# Patient Record
Sex: FEMALE | Race: WHITE | Employment: OTHER | URBAN - METROPOLITAN AREA
[De-identification: names, ages, dates, MRNs, and addresses within clinical notes are randomized per-mention and may not be internally consistent; named-entity substitution may affect disease eponyms.]

---

## 2017-03-03 ENCOUNTER — OFFICE VISIT (OUTPATIENT)
Dept: CARDIOLOGY CLINIC | Age: 72
End: 2017-03-03

## 2017-03-03 VITALS
HEART RATE: 76 BPM | HEIGHT: 64 IN | SYSTOLIC BLOOD PRESSURE: 130 MMHG | WEIGHT: 173 LBS | DIASTOLIC BLOOD PRESSURE: 80 MMHG | BODY MASS INDEX: 29.53 KG/M2

## 2017-03-03 DIAGNOSIS — I48.91 ATRIAL FIBRILLATION, UNSPECIFIED TYPE (HCC): Primary | ICD-10-CM

## 2017-03-03 DIAGNOSIS — I10 ESSENTIAL HYPERTENSION: ICD-10-CM

## 2017-03-03 DIAGNOSIS — E03.9 HYPOTHYROIDISM, UNSPECIFIED TYPE: ICD-10-CM

## 2017-03-03 PROCEDURE — 99214 OFFICE O/P EST MOD 30 MIN: CPT | Performed by: INTERNAL MEDICINE

## 2017-09-22 ENCOUNTER — OFFICE VISIT (OUTPATIENT)
Dept: CARDIOLOGY CLINIC | Age: 72
End: 2017-09-22

## 2017-09-22 VITALS
DIASTOLIC BLOOD PRESSURE: 76 MMHG | HEIGHT: 64 IN | BODY MASS INDEX: 29.02 KG/M2 | HEART RATE: 54 BPM | SYSTOLIC BLOOD PRESSURE: 156 MMHG | WEIGHT: 170 LBS

## 2017-09-22 DIAGNOSIS — I48.91 ATRIAL FIBRILLATION, UNSPECIFIED TYPE (HCC): Primary | ICD-10-CM

## 2017-09-22 DIAGNOSIS — E78.5 HYPERLIPIDEMIA, UNSPECIFIED HYPERLIPIDEMIA TYPE: ICD-10-CM

## 2017-09-22 DIAGNOSIS — I10 ESSENTIAL HYPERTENSION: ICD-10-CM

## 2017-09-22 PROCEDURE — 93000 ELECTROCARDIOGRAM COMPLETE: CPT | Performed by: INTERNAL MEDICINE

## 2017-09-22 PROCEDURE — 99214 OFFICE O/P EST MOD 30 MIN: CPT | Performed by: INTERNAL MEDICINE

## 2017-09-22 RX ORDER — RANITIDINE 150 MG/1
150 TABLET ORAL 2 TIMES DAILY
COMMUNITY
End: 2018-11-29

## 2017-10-16 ENCOUNTER — TELEPHONE (OUTPATIENT)
Dept: CARDIOLOGY CLINIC | Age: 72
End: 2017-10-16

## 2017-10-16 NOTE — TELEPHONE ENCOUNTER
Received a call from pt describing dizziness and head buzzing, can't stand, room is not spinning. Does have multiple sclerosis and uses walker. Lisinopril was dc'd due to anaphylaxis, ICU admission and clonidine was dc'd at the same time. Takes carvedilol 12.5 mg twice daily and has not yet taken her med this am. /98. Has been keeping track of BP and has been 135-174/. Also will be calling pcp to discuss. Please address as [facility] is out.

## 2018-04-16 ENCOUNTER — OFFICE VISIT (OUTPATIENT)
Dept: CARDIOLOGY CLINIC | Age: 73
End: 2018-04-16

## 2018-04-16 VITALS
BODY MASS INDEX: 31.41 KG/M2 | HEART RATE: 62 BPM | WEIGHT: 184 LBS | HEIGHT: 64 IN | DIASTOLIC BLOOD PRESSURE: 82 MMHG | SYSTOLIC BLOOD PRESSURE: 138 MMHG

## 2018-04-16 DIAGNOSIS — I10 ESSENTIAL HYPERTENSION: Primary | ICD-10-CM

## 2018-04-16 DIAGNOSIS — I48.91 ATRIAL FIBRILLATION, UNSPECIFIED TYPE (HCC): ICD-10-CM

## 2018-04-16 PROCEDURE — 99214 OFFICE O/P EST MOD 30 MIN: CPT | Performed by: INTERNAL MEDICINE

## 2018-11-29 ENCOUNTER — OFFICE VISIT (OUTPATIENT)
Dept: CARDIOLOGY CLINIC | Age: 73
End: 2018-11-29
Payer: MEDICARE

## 2018-11-29 VITALS
HEART RATE: 56 BPM | WEIGHT: 181 LBS | DIASTOLIC BLOOD PRESSURE: 78 MMHG | HEIGHT: 64 IN | BODY MASS INDEX: 30.9 KG/M2 | SYSTOLIC BLOOD PRESSURE: 134 MMHG

## 2018-11-29 DIAGNOSIS — I48.91 ATRIAL FIBRILLATION, UNSPECIFIED TYPE (HCC): ICD-10-CM

## 2018-11-29 DIAGNOSIS — I10 ESSENTIAL HYPERTENSION: Primary | ICD-10-CM

## 2018-11-29 PROCEDURE — 99214 OFFICE O/P EST MOD 30 MIN: CPT | Performed by: INTERNAL MEDICINE

## 2018-11-29 NOTE — PROGRESS NOTES
[facility]   Cardiac Evaluation      Patient: Ciro Weinberg  YOB: 1945  Date: 8/10/15       Chief Complaint   Patient presents with    Atrial Fibrillation    Hypertension        Referring provider: Sanaz lAcaraz MD    History of Present Illness:   Ms Marleen Marr is seen today for follow up. She is treated for hypertension and paroxysmal atrial fibrillation. She continues to struggle with multiple sclerosis. Lauren Corona states in August, 2011 she had abdominal surgery with Dr Marykay Moritz for a paraesophageal hernia with Nissen fundoplication and a G tube. March, 2014 Lauren Corona developed tongue swelling when she woke in the morning. She called her sister and was taken to [facility]. She states she woke up 5 days later in the ICU. She states she had an allergic reaction to Lisinopril. Clonidine was also discontinued. She was transferred to [facility] where she went through rehab. Today, Ms Marleen Marr states she has been having trouble with MS because of the fluctuating weather. She denies any chest pain, CASTANEDA, palpitations, dizziness, or edema. Lauren Corona has noticed her heart racing at times; states they occur at night and last long enough for her to notice. She admits to doing things she should not be doing like vacuuming and cleaning up after her new puppy. She continues to walk with a walker. Lauren Corona has developed heartburn that occurs after eating. She is taking Pepcid for this. Past Medical History:   has a past medical history of Hyperlipidemia; Hypertension; Hypothyroidism; MS (multiple sclerosis) ([address] and Pituitary tumor. Surgical History:  Abdominal surgery, ankle surgery    Social History:  History     Social History    Marital Status:       Spouse Name: N/A     Number of Children: N/A    Years of Education: N/A     Occupational History    Western artist     Social History Main Topics    Smoking status: Never Smoker     Smokeless tobacco: Not on file   Dez Alcohol Use: No    Drug Use: No    Sexually Active:       . 2 children who are alive and well. Other Topics Concern    Not on file     Social History Narrative    No narrative on file       Family History:  family history includes Heart Disease in her mother. Allergies:  Levothyroxine sodium; Lisinopril; Lyrica [pregabalin]; and Neurontin [gabapentin]     Review of Systems:   · Constitutional: there has been no unanticipated weight loss. No change in energy or activity level   · Eyes: No visual changes   · ENT: No Headaches, hearing loss or vertigo. No mouth sores or sore throat. · Cardiovascular: Reviewed in HPI  · Respiratory: No cough or wheezing, no sputum production. No hematemesis. · Gastrointestinal: No abdominal pain, appetite loss, blood in stools. No change in bowel or bladder habits. · Genitourinary: No nocturia, dysuria, trouble voiding  · Musculoskeletal:  No gait disturbance, weakness or joint complaints. · Integumentary: No rash or pruritis. · Neurological: No headache, change in muscle strength, numbness or tingling. No change in gait, balance, coordination, mood, affect, memory, mentation, behavior. · Psychiatric: No anxiety or depression  · Endocrine: No malaise or fever  · Hematologic/Lymphatic: No abnormal bruising or bleeding, blood clots or swollen lymph nodes. · Allergic/Immunologic: No nasal congestion or hives. Physical Examination:    Vitals:    11/29/18 1221   BP: 134/78   Site: Left Upper Arm   Position: Sitting   Cuff Size: Medium Adult   Pulse: 56   Weight: 181 lb (82.1 kg)   Height: 5' 4\" (1.626 m)     Body mass index is 31.07 kg/m².      Wt Readings from Last 3 Encounters:   11/29/18 181 lb (82.1 kg)   04/16/18 184 lb (83.5 kg)   09/22/17 170 lb (77.1 kg)     BP Readings from Last 3 Encounters:   11/29/18 134/78   04/16/18 138/82   09/22/17 (!) 156/76        Constitutional and General Appearance:  appears stated age  Respiratory:  · Normal excursion and expansion without use of accessory muscles  · Resp Auscultation: Normal breath sounds without dullness  Cardiovascular:  · The apical impulses not displaced  · Heart is regular rate and rhythm with normal S1, S2  · The carotid upstroke is normal, bruit noted   · JVP is not elevated  · Peripheral pulses are symmetrical  · There is no clubbing, cyanosis of the extremities  · No edema  · Femoral Arteries: 2+ and equal  · Pedal Pulses: 2+ and equal   Abdomen:  · No masses or tenderness  · Normal bowel sounds  Neurological/Psychiatric:  · Alert and oriented x3  · Moves all extremities well  · Exhibits normal gait balance and coordination      Assessment/Plan  1. Essential hypertension -stable   2. Atrial fibrillation -paroxysmal, regular rhythm today  Echo 4/24/14: [facility]) EF 55-60%, mild cLVH,  Moderate AR, mild AS, mildly dilated ascending aorta  Echo 1/9/04: normal left ventricular wall motion and contractility, may be diastolic dysfunction of left ventricle, no significant valvular abnormalities seen. Left atrial size normal   3. DVT -resolved, no longer on Coumadin  4. Carotid stenosis -stable  Doppler 1/53: 3-97% LICA, 18-65% KATIUSKA  5. Hypothyroidism -10/16: TSH 9.66, allergy to Synthroid now on [place] Thyroid. Previously referred to Dr Jennifer Reardon    PLAN:Continue same meds. Scribe's attestation: This note was scribed in the presence of Dr Hadley Menendez MD by Destinee Lan, MERRY. The scribe's documentation has been prepared under my direction and personally reviewed by me in its entirety. I confirm that the note above accurately reflects all work, treatment, procedures, and medical decision making performed by me. Thank you for allowing to me to participate in the care of LIEN Szymanski.

## 2018-12-04 NOTE — COMMUNICATION BODY
[address]   Cardiac Evaluation      Patient: Chris Orozco  YOB: 1945  Date: 8/10/15       Chief Complaint   Patient presents with    Atrial Fibrillation    Hypertension        Referring provider: Mati Michel MD    History of Present Illness:   Ms Blanca Roman is seen today for follow up. She is treated for hypertension and paroxysmal atrial fibrillation. She continues to struggle with multiple sclerosis. Gregory Packer states in August, 2011 she had abdominal surgery with Dr Moraima Rees for a paraesophageal hernia with Nissen fundoplication and a G tube. March, 2014 Gregory Packer developed tongue swelling when she woke in the morning. She called her sister and was taken to [facility]. She states she woke up 5 days later in the ICU. She states she had an allergic reaction to Lisinopril. Clonidine was also discontinued. She was transferred to [facility] where she went through rehab. Today, Ms Blanca Roman states she has been having trouble with MS because of the fluctuating weather. She denies any chest pain, CASTANEDA, palpitations, dizziness, or edema. Gregory Packer has noticed her heart racing at times; states they occur at night and last long enough for her to notice. She admits to doing things she should not be doing like vacuuming and cleaning up after her new puppy. She continues to walk with a walker. Gregory Packer has developed heartburn that occurs after eating. She is taking Pepcid for this. Past Medical History:   has a past medical history of Hyperlipidemia; Hypertension; Hypothyroidism; MS (multiple sclerosis) ([address] and Pituitary tumor. Surgical History:  Abdominal surgery, ankle surgery    Social History:  History     Social History    Marital Status:       Spouse Name: N/A     Number of Children: N/A    Years of Education: N/A     Occupational History    Western artist     Social History Main Topics    Smoking status: Never Smoker     Smokeless tobacco: Not on file   [facility]

## 2019-08-29 ENCOUNTER — OFFICE VISIT (OUTPATIENT)
Dept: CARDIOLOGY CLINIC | Age: 74
End: 2019-08-29
Payer: MEDICARE

## 2019-08-29 VITALS
WEIGHT: 173 LBS | BODY MASS INDEX: 29.53 KG/M2 | SYSTOLIC BLOOD PRESSURE: 140 MMHG | DIASTOLIC BLOOD PRESSURE: 80 MMHG | HEART RATE: 56 BPM | HEIGHT: 64 IN

## 2019-08-29 DIAGNOSIS — I48.91 ATRIAL FIBRILLATION, UNSPECIFIED TYPE (HCC): Primary | ICD-10-CM

## 2019-08-29 DIAGNOSIS — E78.5 HYPERLIPIDEMIA, UNSPECIFIED HYPERLIPIDEMIA TYPE: ICD-10-CM

## 2019-08-29 DIAGNOSIS — I10 ESSENTIAL HYPERTENSION: ICD-10-CM

## 2019-08-29 PROCEDURE — 99214 OFFICE O/P EST MOD 30 MIN: CPT | Performed by: INTERNAL MEDICINE

## 2019-08-29 PROCEDURE — 93000 ELECTROCARDIOGRAM COMPLETE: CPT | Performed by: INTERNAL MEDICINE

## 2019-08-29 RX ORDER — CARVEDILOL 6.25 MG/1
6.25 TABLET ORAL 2 TIMES DAILY WITH MEALS
Qty: 60 TABLET | Refills: 6
Start: 2019-08-29 | End: 2020-02-25

## 2019-11-21 ENCOUNTER — OFFICE VISIT (OUTPATIENT)
Dept: CARDIOLOGY CLINIC | Age: 74
End: 2019-11-21
Payer: MEDICARE

## 2019-11-21 VITALS
HEIGHT: 64 IN | SYSTOLIC BLOOD PRESSURE: 158 MMHG | BODY MASS INDEX: 29.37 KG/M2 | HEART RATE: 60 BPM | WEIGHT: 172 LBS | DIASTOLIC BLOOD PRESSURE: 80 MMHG

## 2019-11-21 DIAGNOSIS — I10 ESSENTIAL HYPERTENSION: ICD-10-CM

## 2019-11-21 DIAGNOSIS — I48.91 ATRIAL FIBRILLATION, UNSPECIFIED TYPE (HCC): Primary | ICD-10-CM

## 2019-11-21 PROCEDURE — 99214 OFFICE O/P EST MOD 30 MIN: CPT | Performed by: INTERNAL MEDICINE

## 2019-11-21 PROCEDURE — 93000 ELECTROCARDIOGRAM COMPLETE: CPT | Performed by: INTERNAL MEDICINE

## 2020-02-25 ENCOUNTER — TELEPHONE (OUTPATIENT)
Dept: CARDIOLOGY CLINIC | Age: 75
End: 2020-02-25

## 2020-02-25 RX ORDER — CARVEDILOL 25 MG/1
TABLET ORAL
Qty: 180 TABLET | Refills: 3 | Status: SHIPPED | OUTPATIENT
Start: 2020-02-25 | End: 2020-02-25

## 2020-02-25 RX ORDER — CARVEDILOL 25 MG/1
12.5 TABLET ORAL 2 TIMES DAILY
Qty: 180 TABLET | Refills: 3
Start: 2020-02-25 | End: 2020-07-20

## 2020-05-28 ENCOUNTER — OFFICE VISIT (OUTPATIENT)
Dept: CARDIOLOGY CLINIC | Age: 75
End: 2020-05-28
Payer: MEDICARE

## 2020-05-28 VITALS
SYSTOLIC BLOOD PRESSURE: 144 MMHG | TEMPERATURE: 98.4 F | HEART RATE: 50 BPM | WEIGHT: 172 LBS | BODY MASS INDEX: 29.37 KG/M2 | DIASTOLIC BLOOD PRESSURE: 70 MMHG | HEIGHT: 64 IN | OXYGEN SATURATION: 94 %

## 2020-05-28 PROCEDURE — 99214 OFFICE O/P EST MOD 30 MIN: CPT | Performed by: INTERNAL MEDICINE

## 2020-05-28 NOTE — LETTER
[address] Cardiology [facility]  [address] [address]  Phone: [phone]  Fax: [phone]    Vivien Pat MD        May 29, 2020     [facility] [address] [address]    Patient: Ishan Payne  MR Number: [id number]  YOB: 1945  Date of Visit: 5/28/2020    Dear Dr. Gurpreet Weathers:    [employer]   Cardiac Evaluation      Patient: Ishan Payne  YOB: 1945  Date: 8/10/15       Chief Complaint   Patient presents with    Atrial Fibrillation    Hypertension        Referring provider: Gurpreet Weathers MD    History of Present Illness:   Ms Mindy Clark is seen today for follow up. She is treated for hypertension and paroxysmal atrial fibrillation. She continues to struggle with multiple sclerosis. Marino Wesbtrook states in August, 2011 she had abdominal surgery with Dr Maddie Child for a paraesophageal hernia with Nissen fundoplication and a G tube. March, 2014 she had angioedema from lisinopril. Today, Ms Mindy Clark is here with her sister. She has been monitoring her blood pressure. Most of her readings are high. Marino Westbrook denies any chest pain, palpitations, dizziness. Her feet and legs are edematous. She has shortness of breath that comes and goes. She has a new motorized wheelchair. Past Medical History:   has a past medical history of Hyperlipidemia, Hypertension, Hypothyroidism, MS (multiple sclerosis) ([address] and Pituitary tumor. Surgical History:  Abdominal surgery, ankle surgery    Social History:  History     Social History    Marital Status:       Spouse Name: N/A     Number of Children: N/A    Years of Education: N/A     Occupational History    Western artist     Social History Main Topics    Smoking status: Never Smoker     Smokeless tobacco: Not on file    Alcohol Use: No    Drug Use: No    Sexually Active:

## 2020-05-29 RX ORDER — HYDROCHLOROTHIAZIDE 12.5 MG/1
12.5 TABLET ORAL DAILY
Qty: 30 TABLET | Refills: 6 | Status: SHIPPED | OUTPATIENT
Start: 2020-05-29 | End: 2020-08-26 | Stop reason: CLARIF

## 2020-06-19 ENCOUNTER — HOSPITAL ENCOUNTER (OUTPATIENT)
Dept: CARDIOLOGY | Age: 75
Discharge: HOME | End: 2020-06-19
Payer: MEDICARE

## 2020-06-19 LAB
LEFT VENTRICULAR EJECTION FRACTION MODE: NORMAL
LV EF: 55 %
LV EF: 55 %
LVEF MODALITY: NORMAL

## 2020-06-19 PROCEDURE — 93306 TTE W/DOPPLER COMPLETE: CPT

## 2020-07-17 ENCOUNTER — TELEPHONE (OUTPATIENT)
Dept: CARDIOLOGY CLINIC | Age: 75
End: 2020-07-17

## 2020-07-17 NOTE — TELEPHONE ENCOUNTER
Pt states her BP has been running high all day and just now it is 157/106 pulse 97. Pt is fatigued, no energy and a heaviness when she breaths in. Pt states she has a lot of Stress right now.  Please call to advise

## 2020-07-17 NOTE — TELEPHONE ENCOUNTER
Pt has had some family arguments, bills and frig problems and woke up this am with hypertension and tachycardia. Her heart rate is usually slow and I had just decreased her coreg to 12.5 mg bid. Her heart rate is now 72 and her BP is a bit better. Imp: ?recurrent AF. She is no longer on AC. Plan; increase coreg to 25 bid. Start asa daily. Come to the office on Monday for EKG and to decide if she needs AC.

## 2020-07-20 ENCOUNTER — NURSE ONLY (OUTPATIENT)
Dept: CARDIOLOGY CLINIC | Age: 75
End: 2020-07-20
Payer: MEDICARE

## 2020-07-20 VITALS — DIASTOLIC BLOOD PRESSURE: 90 MMHG | SYSTOLIC BLOOD PRESSURE: 152 MMHG | HEART RATE: 126 BPM

## 2020-07-20 PROCEDURE — 93000 ELECTROCARDIOGRAM COMPLETE: CPT | Performed by: INTERNAL MEDICINE

## 2020-07-20 RX ORDER — CARVEDILOL 25 MG/1
25 TABLET ORAL 2 TIMES DAILY
Qty: 180 TABLET | Refills: 3
Start: 2020-07-20 | End: 2021-05-04

## 2020-07-20 NOTE — LETTER
[facility] Cardiology [facility] [address]  Phone: [phone]  Fax: [phone]    Bear Napier MD        July 23, 2020     Jorge Gutierrez, [facility] [address] [address]    Patient: Meghan Talamantes  MR Number: [id number]  YOB: 1945  Date of Visit: 7/20/2020    Dear Dr. Jorge Gutierrez:    [address]   Cardiac Evaluation      Patient: Meghan Talamantes  YOB: 1945  Date: 8/10/15       Chief Complaint   Patient presents with    Atrial Fibrillation    Hypertension        Referring provider: Jorge Gutierrez MD    History of Present Illness:   Ms Romayne Cork is seen today for symptoms. She is treated for hypertension and paroxysmal atrial fibrillation. She continues to struggle with multiple sclerosis. Perry Lew states in August, 2011 she had abdominal surgery with Dr Tia Mckeon for a paraesophageal hernia with Nissen fundoplication and a G tube. March, 2014 she had angioedema from lisinopril. Perry Lew called the office 7/17/20 stating she was feeling bad. Her blood pressure and HR's had been running high. I spoke w/ her over the phone and increased Coreg and advised her to come in the office today for EKG and follow up. Perry Lew states she has been under stress with family and bills. She feels her heart racing, but states it has slowed down some. She denies any chest pain, dizziness, or edema. Perry Lew has been fatigued. Past Medical History:   has a past medical history of Hyperlipidemia, Hypertension, Hypothyroidism, MS (multiple sclerosis) ([address] and Pituitary tumor. Surgical History:  Abdominal surgery, ankle surgery    Social History:  History     Social History    Marital Status:       Spouse Name: N/A     Number of Children: N/A    Years of Education: N/A     Occupational History    Western artist     Social History Main Topics    Smoking status: Never Smoker     Smokeless tobacco: Not on file · The apical impulses not displaced  · Heart is regular rate and rhythm with normal S1, S2  · The carotid upstroke is normal, bruit noted   · JVP is not elevated  · Peripheral pulses are symmetrical  · There is no clubbing, cyanosis of the extremities  · No edema  · Femoral Arteries: 2+ and equal  · Pedal Pulses: 2+ and equal   Abdomen:  · No masses or tenderness  · Normal bowel sounds  Neurological/Psychiatric:  · Alert and oriented x3  · Moves all extremities well  · Exhibits normal gait balance and coordination      Assessment/Plan  1. Essential hypertension -readings from home are mostly high  Echo 6/19/20: EF 55%. No regional wall motion abnormalities are seen. Diastolic filling parameters suggest grade I diastolic dysfunction. E/e\"=14.7. Mitral annular calcification is present. Mild mitral regurgitation. Aortic valve appears sclerotic but opens adequately. Moderate aortic regurgitation. Unable to estimate pulmonary artery pressure secondary to incomplete/absentTR jet envelope. 2. Atrial fibrillation -EKG>atrial fibrillation/flutter, HR 126bpm  EKG 11/20/19>sinus rhythm, first degree AV block, PAC's, HR 61bpm  EKG 8/29/19>sinus rhythm, first degree AV block, HR 56bpm -Coreg decreased   Echo 4/24/14: [facility]) EF 55-60%, mild cLVH,  Moderate AR, mild AS, mildly dilated ascending aorta  Echo 1/9/04: normal left ventricular wall motion and contractility, may be diastolic dysfunction of left ventricle, no significant valvular abnormalities seen. Left atrial size normal   3. DVT -resolved, no longer on Coumadin  4. Carotid stenosis -stable  Doppler 4/46: 0-71% LICA, 89-41% KATIUSKA  5. Hypothyroidism -10/16: TSH 9.66, allergy to Synthroid now on [place] Thyroid. Previously referred to Dr Luz Rosario    PLAN: Increase Coreg to 25mg BID, start Eliquis 5mg BID. Continue to monitor b/p and HR. Check TSH, free T4, CMP, and lipids. Scribe's attestation:  This note was scribed in the presence of Dr Mary Keene

## 2020-07-20 NOTE — PROGRESS NOTES
[gabapentin]     Review of Systems:   · Constitutional: there has been no unanticipated weight loss. No change in energy or activity level   · Eyes: No visual changes   · ENT: No Headaches, hearing loss or vertigo. No mouth sores or sore throat. · Cardiovascular: Reviewed in HPI  · Respiratory: No cough or wheezing, no sputum production. No hematemesis. · Gastrointestinal: No abdominal pain, appetite loss, blood in stools. No change in bowel or bladder habits. · Genitourinary: No nocturia, dysuria, trouble voiding  · Musculoskeletal:  No gait disturbance, weakness or joint complaints. · Integumentary: No rash or pruritis. · Neurological: No headache, change in muscle strength, numbness or tingling. MS is worse this year  · Psychiatric: No anxiety or depression  · Endocrine: No malaise or fever  · Hematologic/Lymphatic: No abnormal bruising or bleeding, blood clots or swollen lymph nodes. · Allergic/Immunologic: No nasal congestion or hives. Physical Examination:    Vitals:    07/20/20 1612   BP: (!) 152/90   Site: Right Upper Arm   Pulse: 126     There is no height or weight on file to calculate BMI.      Wt Readings from Last 3 Encounters:   05/28/20 172 lb (78 kg)   11/21/19 172 lb (78 kg)   08/29/19 173 lb (78.5 kg)     BP Readings from Last 3 Encounters:   07/20/20 (!) 152/90   05/28/20 (!) 144/70   11/21/19 (!) 158/80        Constitutional and General Appearance:  appears stated age  Respiratory:  · Normal excursion and expansion without use of accessory muscles  · Resp Auscultation: Normal breath sounds without dullness  Cardiovascular:  · The apical impulses not displaced  · Heart is regular rate and rhythm with normal S1, S2  · The carotid upstroke is normal, bruit noted   · JVP is not elevated  · Peripheral pulses are symmetrical  · There is no clubbing, cyanosis of the extremities  · No edema  · Femoral Arteries: 2+ and equal  · Pedal Pulses: 2+ and equal   Abdomen:  · No masses or me to participate in the care of LIEN Szymanski.

## 2020-07-23 LAB
ALBUMIN SERPL-MCNC: 3.3 G/DL (ref 3.5–5)
ALP BLD-CCNC: 110 IU/L (ref 35–104)
ALT SERPL-CCNC: 15 IU/L (ref 10–35)
ANION GAP SERPL CALCULATED.3IONS-SCNC: 12 MMOL/L (ref 6–18)
AST SERPL-CCNC: 16 IU/L (ref 10–35)
BILIRUB SERPL-MCNC: 0.3 MG/DL (ref 0–1)
BUN BLDV-MCNC: 20 MG/DL (ref 8–26)
CALCIUM SERPL-MCNC: 9.4 MG/DL (ref 8.8–10.1)
CHLORIDE BLD-SCNC: 99 MEQ/L (ref 101–111)
CHOLESTEROL, TOTAL: 175 MG/DL
CO2: 28 MMOL/L (ref 22–29)
CREAT SERPL-MCNC: 0.96 MG/DL (ref 0.44–1.03)
GFR AFRICAN AMERICAN: 64 ML/MIN/1.73 M2
GFR NON-AFRICAN AMERICAN: 56 ML/MIN/1.73 M2
GLUCOSE BLD-MCNC: 111 MG/DL (ref 70–99)
HDLC SERPL-MCNC: 57 MG/DL
LDL CHOLESTEROL CALCULATED: 96 MG/DL
NONHDLC SERPL-MCNC: 118 MG/DL
POTASSIUM SERPL-SCNC: 3.7 MEQ/L (ref 3.6–5.1)
SODIUM BLD-SCNC: 139 MEQ/L (ref 135–145)
T4 FREE: 6.97 NG/DL (ref 0.93–1.7)
TOTAL PROTEIN: 7.5 G/DL (ref 6.6–8.7)
TRIGL SERPL-MCNC: 110 MG/DL
TSH ULTRASENSITIVE: 0.17 MIU/L (ref 0.27–4.2)

## 2020-07-29 ENCOUNTER — OFFICE VISIT (OUTPATIENT)
Dept: CARDIOLOGY CLINIC | Age: 75
End: 2020-07-29
Payer: MEDICARE

## 2020-07-29 VITALS
HEART RATE: 75 BPM | HEIGHT: 64 IN | BODY MASS INDEX: 29.37 KG/M2 | DIASTOLIC BLOOD PRESSURE: 90 MMHG | WEIGHT: 172 LBS | SYSTOLIC BLOOD PRESSURE: 156 MMHG

## 2020-07-29 PROBLEM — E03.9 HYPOTHYROIDISM: Status: ACTIVE | Noted: 2020-07-29

## 2020-07-29 PROBLEM — I65.21 STENOSIS OF RIGHT CAROTID ARTERY: Status: ACTIVE | Noted: 2020-07-29

## 2020-07-29 PROCEDURE — 99214 OFFICE O/P EST MOD 30 MIN: CPT | Performed by: NURSE PRACTITIONER

## 2020-07-29 NOTE — PROGRESS NOTES
· Constitutional: there has been no unanticipated weight loss. No change in energy or activity level   · Eyes: No visual changes   · ENT: No Headaches, hearing loss or vertigo. No mouth sores or sore throat. · Cardiovascular: Reviewed in HPI  · Respiratory: No cough or wheezing, no sputum production. No hematemesis. · Gastrointestinal: No abdominal pain, appetite loss, blood in stools. No change in bowel or bladder habits. · Genitourinary: No nocturia, dysuria, trouble voiding  · Musculoskeletal:  No gait disturbance, weakness or joint complaints. · Integumentary: No rash or pruritis. · Neurological: No headache, change in muscle strength, numbness or tingling. MS is worse this year  · Psychiatric: No anxiety or depression  · Endocrine: No malaise or fever  · Hematologic/Lymphatic: No abnormal bruising or bleeding, blood clots or swollen lymph nodes. · Allergic/Immunologic: No nasal congestion or hives. Physical Examination:    Vitals:    07/29/20 1401 07/29/20 1402   BP: (!) 156/90 (!) 156/90   Site: Left Upper Arm Left Upper Arm   Position: Sitting Sitting   Cuff Size: Medium Adult Large Adult   Pulse:  75   Weight: 172 lb (78 kg)    Height: 5' 4\" (1.626 m)      Body mass index is 29.52 kg/m².      Wt Readings from Last 3 Encounters:   07/29/20 172 lb (78 kg)   05/28/20 172 lb (78 kg)   11/21/19 172 lb (78 kg)     BP Readings from Last 3 Encounters:   07/29/20 (!) 156/90   07/20/20 (!) 152/90   05/28/20 (!) 144/70        Constitutional and General Appearance:  appears stated age  Respiratory:  · Normal excursion and expansion without use of accessory muscles  · Resp Auscultation: Normal breath sounds without dullness  Cardiovascular:  · The apical impulses not displaced  · Heart is irregular rate and rhythm with normal S1, S2  · The carotid upstroke is normal, bruit noted   · JVP is not elevated  · Peripheral pulses are symmetrical  · There is no clubbing, cyanosis of the extremities  · Trace edema BLE  · Femoral Arteries: 2+ and equal  · Pedal Pulses: 2+ and equal   Abdomen:  · No masses or tenderness  · Normal bowel sounds  Neurological/Psychiatric:  · Alert and oriented x3  · Moves all extremities well  · Exhibits normal gait balance and coordination    Assessment/Plan  1. Essential hypertension -readings from home are mostly high  Echo 6/19/20: EF 55%. No regional wall motion abnormalities are seen. Diastolic filling parameters suggest grade I diastolic dysfunction. E/e\"=14.7. Mitral annular calcification is present. Mild mitral regurgitation. Aortic valve appears sclerotic but opens adequately. Moderate aortic regurgitation. Unable to estimate pulmonary artery pressure secondary to incomplete/absentTR jet envelope. 2. Atrial fibrillation - EKG today- Aflutter, HR 78 bpm  EKG 7/20>atrial fibrillation/flutter, HR 126bpm  EKG 11/20/19>sinus rhythm, first degree AV block, PAC's, HR 61bpm  EKG 8/29/19>sinus rhythm, first degree AV block, HR 56bpm -Coreg decreased   Echo 4/24/14: [facility]) EF 55-60%, mild cLVH,  Moderate AR, mild AS, mildly dilated ascending aorta  Echo 1/9/04: normal left ventricular wall motion and contractility, may be diastolic dysfunction of left ventricle, no significant valvular abnormalities seen. Left atrial size normal   3. DVT -resolved, no longer on Coumadin  4. Carotid stenosis -stable       Doppler 2/50: 7-84% LICA, 39-25% KATIUSKA  5. Hypothyroidism -7/23: TSH 6.97, allergy to Synthroid now on [place] Thyroid. Previously referred to Dr Jonnie Andrew    PLAN: [place] Thyroid discontinued. Repeat labs in three weeks. Will discuss cardioversion at [facility] if labs WNL. Will assess need for additional blood pressure medications as thyroid level normalizes. Thank you for allowing to me to participate in the care of [facility]in[facility] Nessa.       Francisco Garcia, APRN - CNP

## 2020-08-10 ENCOUNTER — TELEPHONE (OUTPATIENT)
Dept: CARDIOLOGY CLINIC | Age: 75
End: 2020-08-10

## 2020-08-10 NOTE — TELEPHONE ENCOUNTER
Patient called stating she had reaction from Eliquis and is not going to take it anymore. She states it caused her to be \"comatose\". She could not eat, sleep, or drink. She states she was short of breath when taking it. She states she did not have a fever. She quit taking it and started 325mg ASA. She states she feels better, but it still hurts to breath.

## 2020-08-11 NOTE — TELEPHONE ENCOUNTER
Pt states she is going to back on eliquis. States pleurisy is back and asking what she should do. She thought maybe heat and a couple of 325 mg aspirin.  Please call to advise

## 2020-08-20 ENCOUNTER — TELEPHONE (OUTPATIENT)
Dept: CARDIOLOGY CLINIC | Age: 75
End: 2020-08-20

## 2020-08-20 NOTE — TELEPHONE ENCOUNTER
Called pt, she says she feels better today and the diarrhea has subsided. She had her labs drawn 8/13/20 at [facility].

## 2020-08-20 NOTE — TELEPHONE ENCOUNTER
Pt calling she has been having diarrhea and feeling bad wanting to know if her potassium could be low? Should she take something for this? If so her pharmacy is [address] [facility], [address] Brennen Malin [phone] - [phone].  Pls call to advise thank you

## 2020-08-20 NOTE — TELEPHONE ENCOUNTER
She needs to get her blood work done; I cannot tell if her K is low without getting a blood test.  She can try immodium for her diarrhea or discuss with Dr Shubham Zavala.

## 2020-08-26 ENCOUNTER — OFFICE VISIT (OUTPATIENT)
Dept: CARDIOLOGY CLINIC | Age: 75
End: 2020-08-26
Payer: MEDICARE

## 2020-08-26 VITALS
SYSTOLIC BLOOD PRESSURE: 118 MMHG | WEIGHT: 172 LBS | BODY MASS INDEX: 29.52 KG/M2 | HEART RATE: 70 BPM | DIASTOLIC BLOOD PRESSURE: 80 MMHG | OXYGEN SATURATION: 99 %

## 2020-08-26 PROCEDURE — 99214 OFFICE O/P EST MOD 30 MIN: CPT | Performed by: NURSE PRACTITIONER

## 2020-08-26 PROCEDURE — 93000 ELECTROCARDIOGRAM COMPLETE: CPT | Performed by: NURSE PRACTITIONER

## 2020-08-26 RX ORDER — POTASSIUM CHLORIDE 750 MG/1
10 TABLET, EXTENDED RELEASE ORAL DAILY
Qty: 90 TABLET | Refills: 1 | Status: SHIPPED | OUTPATIENT
Start: 2020-08-26 | End: 2020-09-09

## 2020-08-26 RX ORDER — FUROSEMIDE 20 MG/1
20 TABLET ORAL DAILY
Qty: 30 TABLET | Refills: 5 | Status: ON HOLD | OUTPATIENT
Start: 2020-08-26 | End: 2020-11-13 | Stop reason: HOSPADM

## 2020-08-26 RX ORDER — BIOTIN 10 MG
10 TABLET ORAL DAILY
COMMUNITY

## 2020-08-26 NOTE — PROGRESS NOTES
[pregabalin]; and Neurontin [gabapentin]     Review of Systems:   · Constitutional: fatigued  · Eyes: No visual changes   · ENT: No Headaches, hearing loss or vertigo. No mouth sores or sore throat. · Cardiovascular: Reviewed in HPI  · Respiratory: No cough or wheezing, no sputum production. No hematemesis. · Gastrointestinal: + diarrhea x3 days last week and again last night. Indigestion after every meal.   · Genitourinary: No nocturia, dysuria, trouble voiding  · Musculoskeletal:  No gait disturbance, weakness or joint complaints. · Integumentary: No rash or pruritis. · Neurological: No headache, change in muscle strength, numbness or tingling. MS is worse this year  · Psychiatric: No anxiety or depression  · Endocrine: No malaise or fever  · Hematologic/Lymphatic: No abnormal bruising or bleeding, blood clots or swollen lymph nodes. · Allergic/Immunologic: No nasal congestion or hives. Physical Examination:    Vitals:    08/26/20 1318   BP: 118/80   Site: Left Upper Arm   Position: Sitting   Cuff Size: Medium Adult   Pulse: 70   SpO2: 99%   Weight: 172 lb (78 kg)     Body mass index is 29.52 kg/m².      Wt Readings from Last 3 Encounters:   08/26/20 172 lb (78 kg)   07/29/20 172 lb (78 kg)   05/28/20 172 lb (78 kg)     BP Readings from Last 3 Encounters:   08/26/20 118/80   07/29/20 (!) 156/90   07/20/20 (!) 152/90      Constitutional and General Appearance:  appears stated age  Respiratory:  · Normal excursion and expansion without use of accessory muscles  · Resp Auscultation: Normal breath sounds without dullness  Cardiovascular:  · The apical impulses not displaced  · Heart is irregular rate and rhythm with normal S1, S2  · The carotid upstroke is normal, bruit noted   · JVP is not elevated  · Peripheral pulses are symmetrical  · There is no clubbing, cyanosis of the extremities  · +2 pitting edema BLE  · Femoral Arteries: 2+ and equal  · Pedal Pulses: 2+ and equal   Abdomen:  · No masses or tenderness  · Normal bowel sounds  Neurological/Psychiatric:  · Alert and oriented x3  · Movement limited with MS, using wheelchair   · Exhibits normal gait balance and coordination    Assessment/Plan  1. Essential hypertension -readings from home are mostly high  Echo 6/19/20: EF 55%. No regional wall motion abnormalities are seen. Diastolic filling parameters suggest grade I diastolic dysfunction. E/e\"=14.7. Mitral annular calcification is present. Mild mitral regurgitation. Aortic valve appears sclerotic but opens adequately. Moderate aortic regurgitation. Unable to estimate pulmonary artery pressure secondary to incomplete/absentTR jet envelope. 2. Atrial fibrillation - EKG today (8/26/20)- Aflutter, HR 71 bpm  EKG 7/20>atrial fibrillation/flutter, HR 126bpm  EKG 11/20/19>sinus rhythm, first degree AV block, PAC's, HR 61bpm  EKG 8/29/19>sinus rhythm, first degree AV block, HR 56bpm -Coreg decreased   Echo 4/24/14: [facility]) EF 55-60%, mild cLVH,  Moderate AR, mild AS, mildly dilated ascending aorta  Echo 1/9/04: normal left ventricular wall motion and contractility, may be diastolic dysfunction of left ventricle, no significant valvular abnormalities seen. Left atrial size normal   3. DVT -resolved, no longer on Coumadin  4. Carotid stenosis -stable       Doppler 9/62: 4-97% LICA, 77-20% KATIUSKA  5. Hypothyroidism -8/26: TSH 0.405, T4 3.55, allergy to Synthroid now on [place] Thyroid. Previously referred to Dr Eddie Murrieta  6. Fatigue: diarrhea x3 days last week and again last night. Poor PO intake. PLAN: Imodium PRN for diarrhea. Referral to GI. Change HCTZ to lasix. Take potassium with lasix. OK to stop lasix/potassium if swelling improves. Ensure or Boost daily. Return to the office in 2 weeks to see Dr. Jonny Morrell. Thank you for allowing to me to participate in the care of LIEN Szymanski.       González Bradford, APRN - CNP

## 2020-08-28 ENCOUNTER — TELEPHONE (OUTPATIENT)
Dept: CARDIOLOGY CLINIC | Age: 75
End: 2020-08-28

## 2020-08-28 RX ORDER — FERROUS SULFATE 325(65) MG
325 TABLET ORAL 2 TIMES DAILY
Qty: 180 TABLET | Refills: 1 | Status: SHIPPED | OUTPATIENT
Start: 2020-08-28 | End: 2020-10-08

## 2020-08-28 NOTE — TELEPHONE ENCOUNTER
Jp Jones calling to get the results of the blood work she had done Thursday. Please call to advise. Thank you.

## 2020-08-28 NOTE — TELEPHONE ENCOUNTER
Called patient and spoke to her regarding CBC results. Hgb stable, but trending down over the past few years. On Eliquis 5mg BID. Will start ferrous sulfate, patient verbalized understanding. Patient to be following up with GI. Patient states she has not started the lasix because she had company yesterday and didn't want to have to use the restroom all day. Encouraged her to start it today.

## 2020-09-09 ENCOUNTER — OFFICE VISIT (OUTPATIENT)
Dept: CARDIOLOGY CLINIC | Age: 75
End: 2020-09-09
Payer: MEDICARE

## 2020-09-09 VITALS
HEIGHT: 64 IN | DIASTOLIC BLOOD PRESSURE: 80 MMHG | WEIGHT: 170 LBS | BODY MASS INDEX: 29.02 KG/M2 | OXYGEN SATURATION: 97 % | SYSTOLIC BLOOD PRESSURE: 130 MMHG | HEART RATE: 70 BPM

## 2020-09-09 PROCEDURE — 93000 ELECTROCARDIOGRAM COMPLETE: CPT | Performed by: INTERNAL MEDICINE

## 2020-09-09 PROCEDURE — 99214 OFFICE O/P EST MOD 30 MIN: CPT | Performed by: INTERNAL MEDICINE

## 2020-09-09 RX ORDER — LEVOTHYROXINE AND LIOTHYRONINE 38; 9 UG/1; UG/1
TABLET ORAL
COMMUNITY
Start: 2020-06-29 | End: 2020-09-09

## 2020-09-09 RX ORDER — DILTIAZEM HYDROCHLORIDE 240 MG/1
240 CAPSULE, COATED, EXTENDED RELEASE ORAL DAILY
Qty: 30 CAPSULE | Refills: 6 | Status: SHIPPED | OUTPATIENT
Start: 2020-09-09 | End: 2020-09-15

## 2020-09-09 RX ORDER — VALSARTAN 80 MG/1
80 TABLET ORAL DAILY
Qty: 30 TABLET | Refills: 6 | Status: SHIPPED | OUTPATIENT
Start: 2020-09-09 | End: 2020-12-02

## 2020-09-09 NOTE — LETTER
[address] [place] [address] [address]  Phone: [phone]  Fax: [phone]    Elba Bojorquez MD        September 14, 2020     Patrick Joiner, [address] [place] [id number]    Patient: Gail Hahn  MR Number: [id number]  YOB: 1945  Date of Visit: 9/9/2020    Dear Dr. Patrick Joiner:    [facility], [place], operated by [facility]   Cardiac Evaluation      Patient: Gail Hahn  YOB: 1945  Date: 8/10/15       Chief Complaint   Patient presents with    Atrial Fibrillation    Hypertension        Referring provider: Patrick Joiner MD    History of Present Illness:   Ms Ron Coffey is seen today for follow up. She is treated for hypertension and paroxysmal atrial fibrillation. She continues to struggle with multiple sclerosis. Chevy Horne states in August, 2011 she had abdominal surgery with Dr Carman Boas for a paraesophageal hernia with Nissen fundoplication and a G tube. March, 2014 she had angioedema from lisinopril. Today, Ms Ron Coffey is here with her sister. She was previously started on Eliquis. She called and stated she did not want to take it because of not feeling well on it. She was instructed to continue Eliquis as it does not cause the symptoms she was complaining of (lack of appetite, insomnia, SOB). [place] Thyroid was discontinued after labs revealed TSH 0.173 and T4 6.97. She stopped it for some time, but restarted it recently because she feels better on it. It was while her thyroid levels were high that she re-developed AF. Past Medical History:   has a past medical history of Hyperlipidemia, Hypertension, Hypothyroidism, MS (multiple sclerosis) ([facility] [address] and Pituitary tumor. Surgical History:  Abdominal surgery, ankle surgery    Social History:  History     Social History    Marital Status:       Spouse Name: N/A     Number of Children: N/A    Years of Education: N/A     Occupational History    Western artist Social History Main Topics    Smoking status: Never Smoker     Smokeless tobacco: Not on file    Alcohol Use: No    Drug Use: No    Sexually Active:       . 2 children who are alive and well. Other Topics Concern    Not on file     Social History Narrative    No narrative on file       Family History:  family history includes Heart Disease in her mother. Allergies:  Lisinopril; Levothyroxine sodium; Lyrica [pregabalin]; and Neurontin [gabapentin]     Review of Systems:   · Constitutional: there has been no unanticipated weight loss. No change in energy or activity level   · Eyes: No visual changes   · ENT: No Headaches, hearing loss or vertigo. No mouth sores or sore throat. · Cardiovascular: Reviewed in HPI  · Respiratory: No cough or wheezing, no sputum production. No hematemesis. · Gastrointestinal: No abdominal pain, appetite loss, blood in stools. No change in bowel or bladder habits. · Genitourinary: No nocturia, dysuria, trouble voiding  · Musculoskeletal:  No gait disturbance, weakness or joint complaints. · Integumentary: No rash or pruritis. · Neurological: No headache, change in muscle strength, numbness or tingling. MS is worse this year  · Psychiatric: No anxiety or depression  · Endocrine: No malaise or fever  · Hematologic/Lymphatic: No abnormal bruising or bleeding, blood clots or swollen lymph nodes. · Allergic/Immunologic: No nasal congestion or hives. Physical Examination:    Vitals:    09/09/20 1522   BP: 130/80   Site: Left Upper Arm   Position: Sitting   Cuff Size: Medium Adult   Pulse: 70   SpO2: 97%   Weight: 170 lb (77.1 kg)   Height: 5' 4\" (1.626 m)     Body mass index is 29.18 kg/m².      Wt Readings from Last 3 Encounters:   09/09/20 170 lb (77.1 kg)   08/26/20 172 lb (78 kg)   07/29/20 172 lb (78 kg)     BP Readings from Last 3 Encounters:   09/09/20 130/80   08/26/20 118/80   07/29/20 (!) 156/90 Constitutional and General Appearance:  appears stated age  Respiratory:  · Normal excursion and expansion without use of accessory muscles  · Resp Auscultation: Normal breath sounds without dullness  Cardiovascular:  · The apical impulses not displaced  · Heart is regular rate and rhythm with normal S1, S2  · The carotid upstroke is normal, bruit noted   · JVP is not elevated  · Peripheral pulses are symmetrical  · There is no clubbing, cyanosis of the extremities  · No edema  · Femoral Arteries: 2+ and equal  · Pedal Pulses: 2+ and equal   Abdomen:  · No masses or tenderness  · Normal bowel sounds  Neurological/Psychiatric:  · Alert and oriented x3  · Moves all extremities well  · Exhibits normal gait balance and coordination      Assessment/Plan  1. Essential hypertension -readings from home are mostly high   2. Atrial fibrillation -paroxysmal, regular rhythm today  EKG 11/20/19>sinus rhythm, first degree AV block, PAC's, HR 61bpm  EKG 8/29/19>sinus rhythm, first degree AV block, HR 56bpm -Coreg decreased   Echo 4/24/14: [facility]) EF 55-60%, mild cLVH,  Moderate AR, mild AS, mildly dilated ascending aorta  Echo 1/9/04: normal left ventricular wall motion and contractility, may be diastolic dysfunction of left ventricle, no significant valvular abnormalities seen. Left atrial size normal   3. DVT -resolved, no longer on Coumadin  4. Carotid stenosis -stable  Doppler 3/59: 8-69% LICA, 27-36% KATIUSKA  5. Hypothyroidism -10/16: TSH 9.66, allergy to Synthroid now on [place] Thyroid. Previously referred to Dr Melinda Roe. She was instructed to stop [place] Thyroid after TSH 0.173 and free T4 6.97 on labs 7/23/20. Labs were repeated 8/13/20 and TSH 0.405 w/ free T4 3.55. she says she restarted [place] Thyroid recently because she feels better on it. Instructed, again, to stop as it can cause heart rhythm irregularities. PLAN: Decrease Cardizem to 240mg daily and add Diovan 80mg daily.  She can stop K+. Advised her, again, to stop [place] Thyroid. Repeat TSH and free T4. Return in 1 month. I cannot attempt CV until her T4 is more normal.     Scribe's attestation: This note was scribed in the presence of Dr Bhavani Nguyen MD by Emma Barnett, MERRY. The scribe's documentation has been prepared under my direction and personally reviewed by me in its entirety. I confirm that the note above accurately reflects all work, treatment, procedures, and medical decision making performed by me. Thank you for allowing to me to participate in the care of LIEN Szymanski. If you have questions, please do not hesitate to call me. I look forward to following Chilo Solano along with you.     Sincerely,        Feliz Holm MD

## 2020-09-09 NOTE — PROGRESS NOTES
[address]   Cardiac Evaluation      Patient: Yasmine Julien  YOB: 1945  Date: 8/10/15       Chief Complaint   Patient presents with    Atrial Fibrillation    Hypertension        Referring provider: Teresa Peters MD    History of Present Illness:   Ms Mecca Araujo is seen today for follow up. She is treated for hypertension and paroxysmal atrial fibrillation. She continues to struggle with multiple sclerosis. Rush Vogel states in August, 2011 she had abdominal surgery with Dr Jody Bhatia for a paraesophageal hernia with Nissen fundoplication and a G tube. March, 2014 she had angioedema from lisinopril. Today, Ms Mecca Araujo is here with her sister. She was previously started on Eliquis. She called and stated she did not want to take it because of not feeling well on it. She was instructed to continue Eliquis as it does not cause the symptoms she was complaining of (lack of appetite, insomnia, SOB). [place] Thyroid was discontinued after labs revealed TSH 0.173 and T4 6.97. She stopped it for some time, but restarted it recently because she feels better on it. It was while her thyroid levels were high that she re-developed AF. Past Medical History:   has a past medical history of Hyperlipidemia, Hypertension, Hypothyroidism, MS (multiple sclerosis) ([address] and Pituitary tumor. Surgical History:  Abdominal surgery, ankle surgery    Social History:  History     Social History    Marital Status:      Spouse Name: N/A     Number of Children: Dannie Upton Years of Education: N/A     Occupational History    Western artist     Social History Main Topics    Smoking status: Never Smoker     Smokeless tobacco: Not on file    Alcohol Use: No    Drug Use: No    Sexually Active:       . 2 children who are alive and well.      Other Topics Concern    Not on file     Social History Narrative    No narrative on file       Family History:  family history includes Heart Disease in her mother. Allergies:  Lisinopril; Levothyroxine sodium; Lyrica [pregabalin]; and Neurontin [gabapentin]     Review of Systems:   · Constitutional: there has been no unanticipated weight loss. No change in energy or activity level   · Eyes: No visual changes   · ENT: No Headaches, hearing loss or vertigo. No mouth sores or sore throat. · Cardiovascular: Reviewed in HPI  · Respiratory: No cough or wheezing, no sputum production. No hematemesis. · Gastrointestinal: No abdominal pain, appetite loss, blood in stools. No change in bowel or bladder habits. · Genitourinary: No nocturia, dysuria, trouble voiding  · Musculoskeletal:  No gait disturbance, weakness or joint complaints. · Integumentary: No rash or pruritis. · Neurological: No headache, change in muscle strength, numbness or tingling. MS is worse this year  · Psychiatric: No anxiety or depression  · Endocrine: No malaise or fever  · Hematologic/Lymphatic: No abnormal bruising or bleeding, blood clots or swollen lymph nodes. · Allergic/Immunologic: No nasal congestion or hives. Physical Examination:    Vitals:    09/09/20 1522   BP: 130/80   Site: Left Upper Arm   Position: Sitting   Cuff Size: Medium Adult   Pulse: 70   SpO2: 97%   Weight: 170 lb (77.1 kg)   Height: 5' 4\" (1.626 m)     Body mass index is 29.18 kg/m².      Wt Readings from Last 3 Encounters:   09/09/20 170 lb (77.1 kg)   08/26/20 172 lb (78 kg)   07/29/20 172 lb (78 kg)     BP Readings from Last 3 Encounters:   09/09/20 130/80   08/26/20 118/80   07/29/20 (!) 156/90        Constitutional and General Appearance:  appears stated age  Respiratory:  · Normal excursion and expansion without use of accessory muscles  · Resp Auscultation: Normal breath sounds without dullness  Cardiovascular:  · The apical impulses not displaced  · Heart is regular rate and rhythm with normal S1, S2  · The carotid upstroke is normal, bruit noted   · JVP is not elevated  · Peripheral pulses are symmetrical  · There is no clubbing, cyanosis of the extremities  · No edema  · Femoral Arteries: 2+ and equal  · Pedal Pulses: 2+ and equal   Abdomen:  · No masses or tenderness  · Normal bowel sounds  Neurological/Psychiatric:  · Alert and oriented x3  · Moves all extremities well  · Exhibits normal gait balance and coordination      Assessment/Plan  1. Essential hypertension -readings from home are mostly high   2. Atrial fibrillation -paroxysmal, regular rhythm today  EKG 11/20/19>sinus rhythm, first degree AV block, PAC's, HR 61bpm  EKG 8/29/19>sinus rhythm, first degree AV block, HR 56bpm -Coreg decreased   Echo 4/24/14: [facility]) EF 55-60%, mild cLVH,  Moderate AR, mild AS, mildly dilated ascending aorta  Echo 1/9/04: normal left ventricular wall motion and contractility, may be diastolic dysfunction of left ventricle, no significant valvular abnormalities seen. Left atrial size normal   3. DVT -resolved, no longer on Coumadin  4. Carotid stenosis -stable  Doppler 5/48: 1-02% LICA, 89-54% KATIUSKA  5. Hypothyroidism -10/16: TSH 9.66, allergy to Synthroid now on [place] Thyroid. Previously referred to Dr Georges Habermann. She was instructed to stop [place] Thyroid after TSH 0.173 and free T4 6.97 on labs 7/23/20. Labs were repeated 8/13/20 and TSH 0.405 w/ free T4 3.55. she says she restarted [place] Thyroid recently because she feels better on it. Instructed, again, to stop as it can cause heart rhythm irregularities. PLAN: Decrease Cardizem to 240mg daily and add Diovan 80mg daily. She can stop K+. Advised her, again, to stop [place] Thyroid. Repeat TSH and free T4. Return in 1 month. I cannot attempt CV until her T4 is more normal.     Scribe's attestation: This note was scribed in the presence of Dr Oliva Briggs MD by Joe Riley RN. The scribe's documentation has been prepared under my direction and personally reviewed by me in its entirety.  I confirm that the note above accurately reflects all work, treatment, procedures, and medical decision making performed by me. Thank you for allowing to me to participate in the care of LIEN Szymanski.

## 2020-09-11 ENCOUNTER — TELEPHONE (OUTPATIENT)
Dept: CARDIOLOGY CLINIC | Age: 75
End: 2020-09-11

## 2020-09-15 ENCOUNTER — TELEPHONE (OUTPATIENT)
Dept: CARDIOLOGY CLINIC | Age: 75
End: 2020-09-15

## 2020-09-15 RX ORDER — DILTIAZEM HYDROCHLORIDE 360 MG/1
360 CAPSULE, EXTENDED RELEASE ORAL DAILY
Qty: 180 CAPSULE | Refills: 3 | Status: ON HOLD
Start: 2020-09-15 | End: 2020-11-13 | Stop reason: HOSPADM

## 2020-09-15 NOTE — TELEPHONE ENCOUNTER
Spoke w/ pt. She states her heart has been fluttering and b/p and HR are high. She is not taking armour thyroid. States she made medication changes that Dr Hyun Tse requested 9/9/20 yesterday. Per Dr Codi Fiore back on Cardizem 360mg daily and monitor HR and b/p, then call Friday with how she is doing. I relayed instructions to Woody Mendoza who verbalized understanding.

## 2020-09-15 NOTE — TELEPHONE ENCOUNTER
Lexy Line calling her HR is 90 and BP is 144/101. Heart is beating really fast.  Dizzy off & on all day. No sob. No pain. Is asking for [facility] to call to advise what she should do. Thank you.

## 2020-09-17 ENCOUNTER — TELEPHONE (OUTPATIENT)
Dept: CARDIOLOGY CLINIC | Age: 75
End: 2020-09-17

## 2020-09-17 NOTE — TELEPHONE ENCOUNTER
Spoke to Pt and offered an apt with Dr María Elena Pierre tomorrow at the [facility]. She is not able to make it. Dr. María Elena Pierre advised she see her PCP. I spoke to pt and she will call her PCP to get an apt.

## 2020-09-17 NOTE — TELEPHONE ENCOUNTER
Both legs below the knee are swollen . Her right leg is a lot worse , she can hardly move her right foot. From her ankle to her toes on her right foot is hot . She has had an ice pack on her right foot but it is not helping . Please call to advise

## 2020-10-06 LAB
ALBUMIN SERPL-MCNC: 3.6 G/DL (ref 3.5–5)
ALP BLD-CCNC: 83 IU/L (ref 35–104)
ALT SERPL-CCNC: 16 IU/L (ref 10–35)
ANION GAP SERPL CALCULATED.3IONS-SCNC: 10 MMOL/L (ref 6–18)
AST SERPL-CCNC: 23 IU/L (ref 10–35)
BILIRUB SERPL-MCNC: 0.3 MG/DL (ref 0–1)
BUN BLDV-MCNC: 28 MG/DL (ref 8–26)
CALCIUM SERPL-MCNC: 8.7 MG/DL (ref 8.8–10.1)
CHLORIDE BLD-SCNC: 98 MEQ/L (ref 98–111)
CO2: 30 MMOL/L (ref 21–31)
CREAT SERPL-MCNC: 1.6 MG/DL (ref 0.44–1.03)
GFR AFRICAN AMERICAN: 35 ML/MIN/1.73 M2
GFR NON-AFRICAN AMERICAN: 30 ML/MIN/1.73 M2
GLUCOSE BLD-MCNC: 106 MG/DL (ref 70–99)
POTASSIUM SERPL-SCNC: 3.3 MEQ/L (ref 3.6–5.1)
SODIUM BLD-SCNC: 138 MEQ/L (ref 135–145)
T4 FREE: 5.24 NG/DL (ref 0.93–1.7)
TOTAL PROTEIN: 7.6 G/DL (ref 6.6–8.7)
TSH ULTRASENSITIVE: 0.44 MCIU/ML (ref 0.27–4.2)

## 2020-10-07 ENCOUNTER — TELEPHONE (OUTPATIENT)
Dept: CARDIOLOGY CLINIC | Age: 75
End: 2020-10-07

## 2020-10-07 NOTE — TELEPHONE ENCOUNTER
Ernestina from [facility] calling stating [address] referred pt to them and they liliane her an appt for tomorrow, pt called to cancel stating she doesn't know why she is going there in the first place?  Pls call to advise Thank you

## 2020-10-08 ENCOUNTER — OFFICE VISIT (OUTPATIENT)
Dept: CARDIOLOGY CLINIC | Age: 75
End: 2020-10-08
Payer: MEDICARE

## 2020-10-08 VITALS — DIASTOLIC BLOOD PRESSURE: 66 MMHG | SYSTOLIC BLOOD PRESSURE: 124 MMHG | HEART RATE: 74 BPM | OXYGEN SATURATION: 99 %

## 2020-10-08 LAB
BASOPHILS ABSOLUTE: 0 THOU/MCL (ref 0–0.2)
BASOPHILS ABSOLUTE: 1 %
EOSINOPHILS ABSOLUTE: 0.2 THOU/MCL (ref 0.03–0.45)
EOSINOPHILS RELATIVE PERCENT: 4 %
HCT VFR BLD CALC: 33.9 % (ref 36–46)
HEMOGLOBIN: 11 G/DL (ref 12–15.2)
LYMPHOCYTES ABSOLUTE: 0.7 THOU/MCL (ref 1–4)
LYMPHOCYTES RELATIVE PERCENT: 16 %
MCH RBC QN AUTO: 30.1 PG (ref 27–33)
MCHC RBC AUTO-ENTMCNC: 32.3 G/DL (ref 32–36)
MCV RBC AUTO: 93.1 FL (ref 82–97)
MONOCYTES # BLD: 8 %
MONOCYTES ABSOLUTE: 0.4 THOU/MCL (ref 0.2–0.9)
NEUTROPHILS ABSOLUTE: 3.3 THOU/MCL (ref 1.8–7.7)
PDW BLD-RTO: 17.8 %
PLATELET # BLD: 246 THOU/MCL (ref 140–375)
PMV BLD AUTO: 9.8 FL (ref 7.4–11.5)
RBC # BLD: 3.65 MIL/MCL (ref 3.8–5.2)
SEG NEUTROPHILS: 71 %
WBC # BLD: 4.6 THOU/MCL (ref 3.6–10.5)

## 2020-10-08 PROCEDURE — 99214 OFFICE O/P EST MOD 30 MIN: CPT | Performed by: INTERNAL MEDICINE

## 2020-10-08 PROCEDURE — 93000 ELECTROCARDIOGRAM COMPLETE: CPT | Performed by: INTERNAL MEDICINE

## 2020-10-08 NOTE — PROGRESS NOTES
[facility]   Cardiac Evaluation      Patient: Jarad Willson  YOB: 1945  Date: 8/10/15       Chief Complaint   Patient presents with    Atrial Fibrillation    Hypertension        Referring provider: Jeannine Corona MD    History of Present Illness:   Ms Ronna Abel is seen today for follow up. She is treated for hypertension and paroxysmal atrial fibrillation. She continues to struggle with multiple sclerosis. Sade Lee states in August, 2011 she had abdominal surgery with Dr Lindy Dumont for a paraesophageal hernia with Nissen fundoplication and a G tube. March, 2014 she had angioedema from lisinopril. Today, Ms Ronna Abel is here alone. She has been seen often recently due to recurrent AF with RVR that I initially thought was due to iatrogenic hyperthyroidism. When her AF returned I started Eliquis. Initially she told me it caused lethargy and wouldn't take it but ultimately relented. She states she stopped taking Iron 1-2 weeks ago because of black stools while on it. She states she had black diarrhea last night after being off iron . She states she is no longer taking [place] Thyroid yet her T4 remains elevated(? ). She has been adamant in the past that generic synthroid did not work. Sade Lee has cellulitis which has been treated by pcp with ATB. She was referred to wound clinic by pcp, but is not going. Sade Lee has not seen Dr Orlando Holloway due to transportation issues. Of note her Creat is elevated suggesting worsening renal disease or blood in the GI tract. Past Medical History:   has a past medical history of Hyperlipidemia, Hypertension, Hypothyroidism, MS (multiple sclerosis) ([address] and Pituitary tumor. Surgical History:  Abdominal surgery, ankle surgery    Social History:  History     Social History    Marital Status:       Spouse Name: N/A     Number of Children: N/A    Years of Education: N/A     Occupational History    Western artist     Social History Main Topics  Smoking status: Never Smoker     Smokeless tobacco: Not on file    Alcohol Use: No    Drug Use: No    Sexually Active:       . 2 children who are alive and well. Other Topics Concern    Not on file     Social History Narrative    No narrative on file       Family History:  family history includes Heart Disease in her mother. Allergies:  Lisinopril; Levothyroxine sodium; Lyrica [pregabalin]; and Neurontin [gabapentin]     Review of Systems:   · Constitutional: there has been no unanticipated weight loss. No change in energy or activity level   · Eyes: No visual changes   · ENT: No Headaches, hearing loss or vertigo. No mouth sores or sore throat. · Cardiovascular: Reviewed in HPI  · Respiratory: No cough or wheezing, no sputum production. No hematemesis. · Gastrointestinal: No abdominal pain, appetite loss, blood in stools. No change in bowel or bladder habits. · Genitourinary: No nocturia, dysuria, trouble voiding  · Musculoskeletal:  No gait disturbance, weakness or joint complaints. · Integumentary: No rash or pruritis. · Neurological: No headache, change in muscle strength, numbness or tingling. MS is worse this year  · Psychiatric: No anxiety or depression  · Endocrine: No malaise or fever  · Hematologic/Lymphatic: No abnormal bruising or bleeding, blood clots or swollen lymph nodes. · Allergic/Immunologic: No nasal congestion or hives. Physical Examination:    Vitals:    10/08/20 1512   BP: 124/66   Site: Left Upper Arm   Position: Sitting   Cuff Size: Medium Adult   Pulse: 74   SpO2: 99%     There is no height or weight on file to calculate BMI. Wt Readings from Last 3 Encounters:   09/09/20 170 lb (77.1 kg)   08/26/20 172 lb (78 kg)   07/29/20 172 lb (78 kg)     BP Readings from Last 3 Encounters:   10/08/20 124/66   09/09/20 130/80   08/26/20 118/80        Constitutional and General Appearance:  appears stated age, in a wheelchair.    Respiratory:  · Normal excursion and

## 2020-10-08 NOTE — TELEPHONE ENCOUNTER
Spoke w/ Ernestina and let her know Dr Mirna Warren did not refer patient to Wound Clinic. Advised her to call pcp.

## 2020-10-08 NOTE — TELEPHONE ENCOUNTER
SHERLYN w/ Ernestina to call back. Dr Dionisio Vick did not refer Horacio Deutscher to the [facility] did due to cellulitis.

## 2020-10-08 NOTE — LETTER
[address] Cardiology [facility] [address]  Phone: [phone]  Fax: [phone]    Joseph Soliz MD        October 13, 2020     Audrey Garcia, [address] [place] [id number]    Patient: Sandy Cisneros  MR Number: [id number]  YOB: 1945  Date of Visit: 10/8/2020    Dear Dr. Audrey Garcia:    [address]   Cardiac Evaluation      Patient: Sandy Cisneros  YOB: 1945  Date: 8/10/15       Chief Complaint   Patient presents with    Atrial Fibrillation    Hypertension        Referring provider: Audrey Garcia MD    History of Present Illness:   Ms Hayde Blunt is seen today for follow up. She is treated for hypertension and paroxysmal atrial fibrillation. She continues to struggle with multiple sclerosis. Beti Diane states in August, 2011 she had abdominal surgery with Dr Tiffanie Maldonado for a paraesophageal hernia with Nissen fundoplication and a G tube. March, 2014 she had angioedema from lisinopril. Today, Ms Hayde Blunt is here alone. She has been seen often recently due to recurrent AF with RVR that I initially thought was due to iatrogenic hyperthyroidism. When her AF returned I started Eliquis. Initially she told me it caused lethargy and wouldn't take it but ultimately relented. She states she stopped taking Iron 1-2 weeks ago because of black stools while on it. She states she had black diarrhea last night after being off iron . She states she is no longer taking [place] Thyroid yet her T4 remains elevated(? ). She has been adamant in the past that generic synthroid did not work. Beti Diane has cellulitis which has been treated by pcp with ATB. She was referred to wound clinic by pcp, but is not going. Beti Diane has not seen Dr Yoanna Soria due to transportation issues. Of note her Creat is elevated suggesting worsening renal disease or blood in the GI tract.      Past Medical History: has a past medical history of Hyperlipidemia, Hypertension, Hypothyroidism, MS (multiple sclerosis) ([address] and Pituitary tumor. Surgical History:  Abdominal surgery, ankle surgery    Social History:  History     Social History    Marital Status:      Spouse Name: N/A     Number of Children: Capri Rankin Years of Education: N/A     Occupational History    Western artist     Social History Main Topics    Smoking status: Never Smoker     Smokeless tobacco: Not on file    Alcohol Use: No    Drug Use: No    Sexually Active:       . 2 children who are alive and well. Other Topics Concern    Not on file     Social History Narrative    No narrative on file       Family History:  family history includes Heart Disease in her mother. Allergies:  Lisinopril; Levothyroxine sodium; Lyrica [pregabalin]; and Neurontin [gabapentin]     Review of Systems:   · Constitutional: there has been no unanticipated weight loss. No change in energy or activity level   · Eyes: No visual changes   · ENT: No Headaches, hearing loss or vertigo. No mouth sores or sore throat. · Cardiovascular: Reviewed in HPI  · Respiratory: No cough or wheezing, no sputum production. No hematemesis. · Gastrointestinal: No abdominal pain, appetite loss, blood in stools. No change in bowel or bladder habits. · Genitourinary: No nocturia, dysuria, trouble voiding  · Musculoskeletal:  No gait disturbance, weakness or joint complaints. · Integumentary: No rash or pruritis. · Neurological: No headache, change in muscle strength, numbness or tingling. MS is worse this year  · Psychiatric: No anxiety or depression  · Endocrine: No malaise or fever  · Hematologic/Lymphatic: No abnormal bruising or bleeding, blood clots or swollen lymph nodes. · Allergic/Immunologic: No nasal congestion or hives.     Physical Examination:    Vitals:    10/08/20 1512   BP: 124/66   Site: Left Upper Arm   Position: Sitting   Cuff Size: Medium Adult Pulse: 74   SpO2: 99%     There is no height or weight on file to calculate BMI. Wt Readings from Last 3 Encounters:   09/09/20 170 lb (77.1 kg)   08/26/20 172 lb (78 kg)   07/29/20 172 lb (78 kg)     BP Readings from Last 3 Encounters:   10/08/20 124/66   09/09/20 130/80   08/26/20 118/80        Constitutional and General Appearance:  appears stated age, in a wheelchair. Respiratory:  · Normal excursion and expansion without use of accessory muscles  · Resp Auscultation: Normal breath sounds without dullness  Cardiovascular:  · The apical impulses not displaced  · Heart is irregular rate and rhythm with normal S1, S2; rates are better controlled. · The carotid upstroke is normal, bruit noted   · JVP is not elevated  · Peripheral pulses are symmetrical  · There is no clubbing, cyanosis of the extremities  · 2-3+ edema with wrapped legs. · Femoral Arteries: 2+ and equal  · Pedal Pulses: 2+ and equal   Abdomen:  · No masses or tenderness  · Normal bowel sounds  Neurological/Psychiatric:  · Alert and oriented x3  · Very weak. In a wheel chair    Assessment/Plan  1. Essential hypertension -readings from home are mostly controlled    2. Atrial fibrillation -paroxysmal, EKG>(10/8/20) atrial fibrillation w/ controlled rate- I have not been able to do a CV because her T4 levels remain high. She tells me she has been off the [place] thyroid since I told her on 7/29/20 yet her level remains high. (???) Has she developed hyperthyroidism or is she surreptitiously still taking the med. I would need for her to see an endocrinologist. She has also had more LE edema since being in AF. Her heart rates are more controlled now compared to [address]. Now she is on Eliquis but tells me she has had black stools. I told her to stop it and that she needs a GI evaluation, preferably in the hospital.  She is reluctant to leave her dog. I have ordered a stat CBC for today and will call DR Wren. Echo 6/19/20 EF 55% with mild MR, sclerotic AV, moderate AR  EKG 11/20/19>sinus rhythm, first degree AV block, PAC's, HR 61bpm  EKG 8/29/19>sinus rhythm, first degree AV block, HR 56bpm -Coreg decreased   Echo 4/24/14: [facility] EF 55-60%, mild cLVH,  Moderate AR, mild AS, mildly dilated ascending aorta  Echo 1/9/04: normal left ventricular wall motion and contractility, may be diastolic dysfunction of left ventricle, no significant valvular abnormalities seen. Left atrial size normal   3. DVT -resolved, no longer on Coumadin  4. Carotid stenosis -stable  Doppler 2/27: 8-46% LICA, 60-69% KATIUSKA  5. Hypothyroidism -10/16: TSH 9.66, intolerance? to Synthroid and was  on [place] Thyroid. Previously referred to Dr Berna Marrufo. She was instructed to stop [place] Thyroid after TSH 0.173 and free T4 6.97 on labs 7/23/20. Labs were repeated 8/13/20 and TSH 0.405 w/ free T4 3.55. she says she restarted [place] Thyroid recently because she feels better on it. Instructed, again at last visit, to stop as it can cause heart rhythm irregularities. Repeat TSH 10/6/20: 0.438, T4 5.24      PLAN:  Will get a CBC today and call Dr Luis E Kirby. She needs to have a GI eval for bleeding and endocrinology consult for persistently elevated T4. I have asked her to go to the hospital but she doesn't want to leave her dog. .     Scribe's attestation: This note was scribed in the presence of Dr Rudine Nyhan MD by Sonia Jaramillo, MERRY. The scribe's documentation has been prepared under my direction and personally reviewed by me in its entirety. I confirm that the note above accurately reflects all work, treatment, procedures, and medical decision making performed by me. Thank you for allowing to me to participate in the care of LIEN Szymanski. If you have questions, please do not hesitate to call me. I look forward to following Allie Matti along with you.     Sincerely,        Tahmina Fink MD

## 2020-10-12 ENCOUNTER — TELEPHONE (OUTPATIENT)
Dept: CARDIOLOGY CLINIC | Age: 75
End: 2020-10-12

## 2020-10-12 NOTE — TELEPHONE ENCOUNTER
Katarzyna, I spoke to Ms. Peñaloza. She said things are better, having no issues at this time. I told her I would update you tomorrow and that you would get back to her if anything else needs done. I asked her to call us back if any other issues come up. She knows [facility] is OOT. Thanks.

## 2020-10-13 NOTE — TELEPHONE ENCOUNTER
Per Dr Jose Antonio López should go to ER to be evaluated, she needs a GI workup and endocrinologist. 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 She is not on Eliquis currently due to low blood count and tarry stools. I called Bakari Wisdom and advised her that Dr Edvin Caldwell wants her to go to ER to be evaluated. She states she does not want to go to the hospital at this time. She states Dr Rubina Saini referred her to a GI Dr. She states the GI office called her Friday of last week and offered her an appt. She states she declined because she has too many bills and does not want to see GI at this time. I discussed, with her, the importance of following up with GI as her blood count is low and she potentially needs a GI workup.  She states she will call Dr Do vigil today and let her know she did not purchase the stool kits and did not make appt with GI.

## 2020-11-11 PROBLEM — N17.9 ACUTE RENAL FAILURE (ARF) (HCC): Status: ACTIVE | Noted: 2020-11-11

## 2020-11-12 ENCOUNTER — APPOINTMENT (OUTPATIENT)
Dept: ULTRASOUND IMAGING | Age: 75
DRG: 683 | End: 2020-11-12
Attending: INTERNAL MEDICINE
Payer: MEDICARE

## 2020-11-12 ENCOUNTER — HOSPITAL ENCOUNTER (INPATIENT)
Age: 75
LOS: 1 days | Discharge: HOME | DRG: 683 | End: 2020-11-13
Attending: INTERNAL MEDICINE | Admitting: INTERNAL MEDICINE
Payer: MEDICARE

## 2020-11-12 PROBLEM — G45.9 TIA (TRANSIENT ISCHEMIC ATTACK): Status: ACTIVE | Noted: 2020-11-12

## 2020-11-12 PROBLEM — K92.1 MELENA: Status: ACTIVE | Noted: 2020-11-12

## 2020-11-12 LAB
A/G RATIO: 1.1 (ref 1.1–2.2)
ALBUMIN SERPL-MCNC: 3.6 G/DL (ref 3.4–5)
ALP BLD-CCNC: 89 U/L (ref 40–129)
ALT SERPL-CCNC: 37 U/L (ref 10–40)
ANION GAP SERPL CALCULATED.3IONS-SCNC: 10 MMOL/L (ref 3–16)
AST SERPL-CCNC: 48 U/L (ref 15–37)
BASOPHILS ABSOLUTE: 0.1 K/UL (ref 0–0.2)
BASOPHILS RELATIVE PERCENT: 2.1 %
BILIRUB SERPL-MCNC: <0.2 MG/DL (ref 0–1)
BILIRUBIN URINE: NEGATIVE
BLOOD, URINE: NEGATIVE
BUN BLDV-MCNC: 28 MG/DL (ref 7–20)
CALCIUM SERPL-MCNC: 9.1 MG/DL (ref 8.3–10.6)
CHLORIDE BLD-SCNC: 102 MMOL/L (ref 99–110)
CLARITY: ABNORMAL
CO2: 26 MMOL/L (ref 21–32)
COLOR: YELLOW
CREAT SERPL-MCNC: 1.2 MG/DL (ref 0.6–1.2)
EOSINOPHILS ABSOLUTE: 0.1 K/UL (ref 0–0.6)
EOSINOPHILS RELATIVE PERCENT: 3.6 %
EPITHELIAL CELLS, UA: 0 /HPF (ref 0–5)
GFR AFRICAN AMERICAN: 53
GFR NON-AFRICAN AMERICAN: 44
GLOBULIN: 3.4 G/DL
GLUCOSE BLD-MCNC: 154 MG/DL (ref 70–99)
GLUCOSE URINE: NEGATIVE MG/DL
HCT VFR BLD CALC: 31.5 % (ref 36–48)
HEMOGLOBIN: 10.8 G/DL (ref 12–16)
HYALINE CASTS: 0 /LPF (ref 0–8)
KETONES, URINE: NEGATIVE MG/DL
LEUKOCYTE ESTERASE, URINE: NEGATIVE
LYMPHOCYTES ABSOLUTE: 0.7 K/UL (ref 1–5.1)
LYMPHOCYTES RELATIVE PERCENT: 18.4 %
MCH RBC QN AUTO: 32.5 PG (ref 26–34)
MCHC RBC AUTO-ENTMCNC: 34.3 G/DL (ref 31–36)
MCV RBC AUTO: 94.7 FL (ref 80–100)
MICROSCOPIC EXAMINATION: YES
MONOCYTES ABSOLUTE: 0.2 K/UL (ref 0–1.3)
MONOCYTES RELATIVE PERCENT: 6 %
NEUTROPHILS ABSOLUTE: 2.5 K/UL (ref 1.7–7.7)
NEUTROPHILS RELATIVE PERCENT: 69.9 %
NITRITE, URINE: NEGATIVE
PDW BLD-RTO: 19.6 % (ref 12.4–15.4)
PH UA: 7 (ref 5–8)
PLATELET # BLD: 154 K/UL (ref 135–450)
PMV BLD AUTO: 9.9 FL (ref 5–10.5)
POTASSIUM SERPL-SCNC: 3.6 MMOL/L (ref 3.5–5.1)
PROTEIN UA: NEGATIVE MG/DL
RBC # BLD: 3.33 M/UL (ref 4–5.2)
RBC UA: 1 /HPF (ref 0–4)
SODIUM BLD-SCNC: 138 MMOL/L (ref 136–145)
SPECIFIC GRAVITY UA: 1.01 (ref 1–1.03)
TOTAL PROTEIN: 7 G/DL (ref 6.4–8.2)
URINE TYPE: ABNORMAL
UROBILINOGEN, URINE: 0.2 E.U./DL
WBC # BLD: 3.6 K/UL (ref 4–11)
WBC UA: 1 /HPF (ref 0–5)

## 2020-11-12 PROCEDURE — 80053 COMPREHEN METABOLIC PANEL: CPT

## 2020-11-12 PROCEDURE — 82533 TOTAL CORTISOL: CPT

## 2020-11-12 PROCEDURE — 99223 1ST HOSP IP/OBS HIGH 75: CPT | Performed by: INTERNAL MEDICINE

## 2020-11-12 PROCEDURE — 76770 US EXAM ABDO BACK WALL COMP: CPT

## 2020-11-12 PROCEDURE — 82570 ASSAY OF URINE CREATININE: CPT

## 2020-11-12 PROCEDURE — G0378 HOSPITAL OBSERVATION PER HR: HCPCS

## 2020-11-12 PROCEDURE — 2580000003 HC RX 258: Performed by: INTERNAL MEDICINE

## 2020-11-12 PROCEDURE — G0379 DIRECT REFER HOSPITAL OBSERV: HCPCS

## 2020-11-12 PROCEDURE — 96361 HYDRATE IV INFUSION ADD-ON: CPT

## 2020-11-12 PROCEDURE — 84156 ASSAY OF PROTEIN URINE: CPT

## 2020-11-12 PROCEDURE — 81001 URINALYSIS AUTO W/SCOPE: CPT

## 2020-11-12 PROCEDURE — 36415 COLL VENOUS BLD VENIPUNCTURE: CPT

## 2020-11-12 PROCEDURE — 85025 COMPLETE CBC W/AUTO DIFF WBC: CPT

## 2020-11-12 PROCEDURE — 6370000000 HC RX 637 (ALT 250 FOR IP): Performed by: INTERNAL MEDICINE

## 2020-11-12 PROCEDURE — 1200000000 HC SEMI PRIVATE

## 2020-11-12 PROCEDURE — 82550 ASSAY OF CK (CPK): CPT

## 2020-11-12 PROCEDURE — 6370000000 HC RX 637 (ALT 250 FOR IP): Performed by: PHYSICIAN ASSISTANT

## 2020-11-12 PROCEDURE — 96360 HYDRATION IV INFUSION INIT: CPT

## 2020-11-12 PROCEDURE — 84443 ASSAY THYROID STIM HORMONE: CPT

## 2020-11-12 PROCEDURE — 2060000000 HC ICU INTERMEDIATE R&B

## 2020-11-12 RX ORDER — POTASSIUM CHLORIDE 20 MEQ/1
10 TABLET, EXTENDED RELEASE ORAL
Status: DISCONTINUED | OUTPATIENT
Start: 2020-11-12 | End: 2020-11-13

## 2020-11-12 RX ORDER — POTASSIUM CHLORIDE 1.5 G/1.77G
10 POWDER, FOR SOLUTION ORAL DAILY
COMMUNITY
End: 2021-02-15

## 2020-11-12 RX ORDER — SODIUM CHLORIDE 9 MG/ML
INJECTION, SOLUTION INTRAVENOUS CONTINUOUS
Status: DISCONTINUED | OUTPATIENT
Start: 2020-11-12 | End: 2020-11-13

## 2020-11-12 RX ORDER — ACETAMINOPHEN 325 MG/1
650 TABLET ORAL EVERY 4 HOURS PRN
Status: DISCONTINUED | OUTPATIENT
Start: 2020-11-12 | End: 2020-11-13 | Stop reason: HOSPADM

## 2020-11-12 RX ORDER — DILTIAZEM HYDROCHLORIDE 240 MG/1
240 CAPSULE, COATED, EXTENDED RELEASE ORAL NIGHTLY
Status: DISCONTINUED | OUTPATIENT
Start: 2020-11-12 | End: 2020-11-13 | Stop reason: HOSPADM

## 2020-11-12 RX ORDER — PANTOPRAZOLE SODIUM 40 MG/1
40 TABLET, DELAYED RELEASE ORAL
Status: DISCONTINUED | OUTPATIENT
Start: 2020-11-12 | End: 2020-11-13 | Stop reason: HOSPADM

## 2020-11-12 RX ORDER — SODIUM CHLORIDE 0.9 % (FLUSH) 0.9 %
10 SYRINGE (ML) INJECTION EVERY 12 HOURS SCHEDULED
Status: DISCONTINUED | OUTPATIENT
Start: 2020-11-12 | End: 2020-11-13 | Stop reason: HOSPADM

## 2020-11-12 RX ORDER — AMLODIPINE BESYLATE 5 MG/1
2.5 TABLET ORAL DAILY
Status: DISCONTINUED | OUTPATIENT
Start: 2020-11-12 | End: 2020-11-12

## 2020-11-12 RX ORDER — SODIUM CHLORIDE 0.9 % (FLUSH) 0.9 %
10 SYRINGE (ML) INJECTION PRN
Status: DISCONTINUED | OUTPATIENT
Start: 2020-11-12 | End: 2020-11-13 | Stop reason: HOSPADM

## 2020-11-12 RX ORDER — LEVOTHYROXINE SODIUM 0.1 MG/1
100 TABLET ORAL DAILY
Status: DISCONTINUED | OUTPATIENT
Start: 2020-11-12 | End: 2020-11-13 | Stop reason: HOSPADM

## 2020-11-12 RX ORDER — VALSARTAN 80 MG/1
80 TABLET ORAL DAILY
Status: DISCONTINUED | OUTPATIENT
Start: 2020-11-12 | End: 2020-11-12

## 2020-11-12 RX ORDER — ONDANSETRON 2 MG/ML
4 INJECTION INTRAMUSCULAR; INTRAVENOUS EVERY 6 HOURS PRN
Status: DISCONTINUED | OUTPATIENT
Start: 2020-11-12 | End: 2020-11-13 | Stop reason: HOSPADM

## 2020-11-12 RX ORDER — FERROUS SULFATE 325(65) MG
325 TABLET ORAL 2 TIMES DAILY WITH MEALS
COMMUNITY

## 2020-11-12 RX ORDER — CARVEDILOL 25 MG/1
25 TABLET ORAL 2 TIMES DAILY WITH MEALS
Status: DISCONTINUED | OUTPATIENT
Start: 2020-11-12 | End: 2020-11-13 | Stop reason: HOSPADM

## 2020-11-12 RX ADMIN — Medication 10 ML: at 21:38

## 2020-11-12 RX ADMIN — LEVOTHYROXINE SODIUM 100 MCG: 100 TABLET ORAL at 13:32

## 2020-11-12 RX ADMIN — SODIUM CHLORIDE: 9 INJECTION, SOLUTION INTRAVENOUS at 04:23

## 2020-11-12 RX ADMIN — DILTIAZEM HYDROCHLORIDE 240 MG: 240 CAPSULE, COATED, EXTENDED RELEASE ORAL at 21:38

## 2020-11-12 RX ADMIN — CARVEDILOL 25 MG: 25 TABLET, FILM COATED ORAL at 08:21

## 2020-11-12 RX ADMIN — CARVEDILOL 25 MG: 25 TABLET, FILM COATED ORAL at 17:48

## 2020-11-12 RX ADMIN — Medication 10 ML: at 13:33

## 2020-11-12 RX ADMIN — PANTOPRAZOLE SODIUM 40 MG: 40 TABLET, DELAYED RELEASE ORAL at 15:39

## 2020-11-12 RX ADMIN — VALSARTAN 80 MG: 80 TABLET, FILM COATED ORAL at 08:21

## 2020-11-12 ASSESSMENT — ENCOUNTER SYMPTOMS
PHOTOPHOBIA: 0
VOMITING: 0
COUGH: 0
DIARRHEA: 0
SORE THROAT: 0
ABDOMINAL PAIN: 0
EYE PAIN: 0
BLOOD IN STOOL: 0
NAUSEA: 0
CONSTIPATION: 0
EYE REDNESS: 0
SHORTNESS OF BREATH: 0
CHEST TIGHTNESS: 0
WHEEZING: 0
RHINORRHEA: 0

## 2020-11-12 ASSESSMENT — PAIN SCALES - GENERAL
PAINLEVEL_OUTOF10: 0
PAINLEVEL_OUTOF10: 0

## 2020-11-12 NOTE — CONSULTS
[facility]   Electrophysiology Consultation   Date: 11/12/2020  Reason for Consultation: Atrial fibrillation    Consult Requesting Physician: Madison Preston MD   CC: Atrial fibrillation, Shortness of breath, generalized weakness    CC: Atrial fibrillation    HPI: Sharda Caro is a 66 y.o. female with history of HTN, HLD, hypothyroidism, multiple sclerosis, pituitary tumor, CKD and persistent atrial fibrillation, transferred from [facility] ER with multiple complaints. She complains of extreme fatigue, LE swelling, weakness and lack of appetite. She also complains of intermittent dark stool. Her skin is dry. She also complain of dizziness and lightheadedness. She was treated with Eliquis for anticoagulation but stopped taking it because developed dark stool. She was taking Iron and stopped taking that too. She has history of hypothyroidism and was taking [place] thyroid but has stopped taking it. Her TSH is now 120. She states that she had allergic reaction to generic thyroid hormone therapy. She presented to the [facility] emergency room with feeling dizzy, and weakness. She also has has LE swelling. She has had blurry visions. She has stopped taking Eliquis. She was noted to be anemic and also high creatinine and has been transferred to [facility]. She has history of polypoid lesion in distal esohagus and hiatal hernia. There is also report of dilatation of esophagus done during her EGD in 2016. EP has been consulted to assess the patient for possible cardioversion. Assessment and plan:     - Persistent atrial fibrillation:    Last ECG with sinus rhythm was on 11/21/2019. Patient ECG on 7/20/2020 with atrial fibrillation. It appears that she has been in atrial fibrillation since 7/2020 or before that. Patient feels fatigue, weak and SOB and LE swelling. ? Contribution of atrial fibrillation with her symptoms. Discussed treatment options including cardioversion. She must be able to take anticoagulation before we can proceed with cardioversion. She is being evaluated by GI. If negative workup and she can take anticoagulation will consider ALVARO/cardioversion.     - Sever hypothyroidism    Weakness, fatigue, LE swelling, dry skin    ? Reaction to generic thyroid hormone in the past.    Needs hormone replacement therapy. Will defer to primary team.     HFpEF:    I/Os   Calculated H2FPEF score is:  7     Clinical Variable       Points  ====================================================  Body mass index > 30 kg/m2     2  Two or more antihypertensive medicines   1  Atrial fibrillation      3  Echo with PA systolic pressure > 35 mmHg   1  Age > 60 years      1  Doppler echo with E/e > 9      1     Total points:         0 1 2 3 4 5 6 7 8    Probability of         0.2      0.3   0.4  0.5  0.6    0.7    0.8         0.9      0.95    HFpEF   - Restoration of sinus rhythm is important    - Salt restriction < 2 grams/day    - JOSHUA:    Creatine follow up. - Anemia and dark stool ? GI bleeding    Off Eliquis. GI evaluation for whether she can james anticoagulation.     - Obesity: Body mass index is 32.61 kg/m². - HTN: controlled. Discussed with nursing staff. Discussed with referring physician. Diagnostic studies:     ECG: Atrial fibrillation, controlled rate   Echo:     EF: 61%, Diastolic dysfunction, MAC present, Mild MR. Moderate AI.     TSH: 120     I independently reviewed the cardiac diagnostic studies, ECG and relevant imaging studies. Physical Examination:  Vitals:    20 0800   BP: (!) 142/91   Pulse: 66   Resp: 18   Temp: 97.4 °F (36.3 °C)   SpO2: 96%      No intake/output data recorded.    Wt Readings from Last 3 Encounters:   20 184 lb 1.4 oz (83.5 kg)   20 170 lb (77.1 kg)   20 172 lb (78 kg)     Temp  Av.5 °F (36.4 °C)  Min: 97 °F (36.1 °C)  Max: 98.2 °F (36.8 °C)  Pulse  Av.7  Min: 54  Max: 66  BP  Min: 142/91 Max: 154/94  SpO2  Av.3 %  Min: 96 %  Max: 97 %  No intake or output data in the 24 hours ending 20 1058      I independently reviewed all cardiac tracing from cardiac telemetry. · Constitutional: Oriented. No distress. · Head: Normocephalic and atraumatic. · Mouth/Throat: Oropharynx is clear and moist.   · Eyes: Conjunctivae normal. EOM are normal.   · Neck: Neck supple. No JVD present. · Cardiovascular: Normal rate, Irregular rhythm, S1&S2. · Pulmonary/Chest: Bilateral respiratory sounds. No rhonchi. · Abdominal: Soft. No tenderness. · Musculoskeletal: No tenderness. No edema    · Lymphadenopathy: Has no cervical adenopathy. · Neurological: Alert and oriented. Follows command, No Gross deficit   · Skin: Skin is warm, No rash noted. · Psychiatric: Has a normal behavior       Scheduled Meds:   carvedilol  25 mg Oral BID WC    valsartan  80 mg Oral Daily    potassium chloride  10 mEq Oral Daily with breakfast    dilTIAZem  240 mg Oral Nightly    levothyroxine  100 mcg Oral Daily     Continuous Infusions:   sodium chloride 75 mL/hr at 20 0423     PRN Meds:.     Review of System:  [x] Full ROS obtained and negative except as mentioned in HPI    Prior to Admission medications    Medication Sig Start Date End Date Taking?  Authorizing Provider   ferrous sulfate (IRON 325) 325 (65 Fe) MG tablet Take 325 mg by mouth 2 times daily (with meals)   Yes Historical Provider, MD   carvedilol (COREG) 25 MG tablet Take 1 tablet by mouth 2 times daily 20  Yes Patrick Akhtar MD   potassium chloride (KLOR-CON) 20 MEQ packet Take 10 mEq by mouth daily    Historical Provider, MD   dilTIAZem (CARDIZEM CD) 360 MG extended release capsule Take 1 capsule by mouth daily  Patient taking differently: Take 240 mg by mouth daily  9/15/20   Patrick Akhtar MD   valsartan (DIOVAN) 80 MG tablet Take 1 tablet by mouth daily 20   Patrick Akhtar MD   Biotin 10 MG tablet Take 10 mg by mouth daily    Historical Provider, MD   furosemide (LASIX) 20 MG tablet Take 1 tablet by mouth daily 8/26/20   Gayathri Armendariz APRN - CNP   apixaban (ELIQUIS) 5 MG TABS tablet Take 1 tablet by mouth 2 times daily 7/20/20   Rosario Vizcaino MD       Past Medical History:   Diagnosis Date    Hyperlipidemia     Hypertension     Hypothyroidism     MS (multiple sclerosis) ([address]     Pituitary tumor         Past Surgical History:   Procedure Laterality Date    STOMACH SURGERY      THYROIDECTOMY         Allergies   Allergen Reactions    Lisinopril Other (See Comments)     angioedema    Levothyroxine Sodium     Lyrica [Pregabalin]     Neurontin [Gabapentin]        Social History:  Reviewed. reports that she has never smoked. She has never used smokeless tobacco. She reports that she does not drink alcohol or use drugs. Family History:  Reviewed. Reviewed. No family history of SCD. Relevant and available labs, and cardiovascular diagnostics reviewed. Reviewed. Recent Labs     11/12/20  0503      K 3.6      CO2 26   BUN 28*   CREATININE 1.2     Recent Labs     11/12/20  0503   WBC 3.6*   HGB 10.8*   HCT 31.5*   MCV 94.7        Estimated Creatinine Clearance: 40 mL/min (based on SCr of 1.2 mg/dL). No results found for: BNP      I independently reviewed all cardiac tracing from cardiac telemetry. I independently reviewed relevant and available cardiac diagnostic tests ECG, CXR, Echo, Stress test, Device interrogation, Holter, CT scan. Outside medical records via Care everywhere reviewed and summarized in H&P above. Complex medical condition with multiple medical problems affecting prognosis and outcome of EP interventions  Severe exacerbation of underlying medical condition requiring hospitalization and at risk of decompensation. All questions and concerns were addressed to the patient/family. Alternatives to my treatment were discussed.  I have discussed the above stated plan and the patient verbalized understanding and agreed with the plan. NOTE: This report was transcribed using voice recognition software. Every effort was made to ensure accuracy, however, inadvertent computerized transcription errors may be present.      Winston Soriano MD, MPH  [facility]   Office: [phone]

## 2020-11-12 NOTE — PROGRESS NOTES
Patient arrived [facility]  via stretcher, alert and orient x 4, RA , [email or web address], denies,  pain or sob , c/o mild dizziness and ringing  In the Ear,  oriented to room and call light, . Dr Kt Parson notified  of patient arrival , orders given , All safety precaution put in place.  Plan of care continue

## 2020-11-12 NOTE — CONSULTS
Gastroenterology Consult Note      Patient: Catie Garcia  : 1945  Acct#:      Date:  2020    Subjective:       History of Present Illness  Patient is a 66 y.o.  female admitted with Acute renal failure (ARF) ([facility] [address] [N17.9] who is seen in consult for h/o black stool. H/o MS. H/o paroxysmal afib. Was on Eliquis but stopped taking this a few weeks ago d/t nausea. She presented to outside ED with weakness and dizziness. CT head neg for acute CVA. Was transferred here for cardiology eval. Cardiology planning resuming an anticoagulant if no GI contraindication. Pt reports being started on PO iron recently. Then stools turned black. She stopped taking it to see if this was causing the black stools and her stool was then brown off the iron. No hematochezia. Has heartburn and has been taking Pepcid as needed. Still having symptoms with pepcid. Some nausea with eating for a few months. No vomiting, no abdominal pain. Intermittent dysphagia for years which seems to occur when she is out to eat. Past Medical History:   Diagnosis Date    Hyperlipidemia     Hypertension     Hypothyroidism     MS (multiple sclerosis) ([facility] [address]     Pituitary tumor       Past Surgical History:   Procedure Laterality Date    STOMACH SURGERY      THYROIDECTOMY        Past Endoscopic History  EGD  with Dr Lesli Barry  Indications: Surveillance due to prior colonic neoplasia and Other, specify esophageal dysphagia gastroesophageal reflux history gastric polyp  Procedure Description: Patient required monitored anesthesia care. Once the patient was adequately sedated and the airway was secure the Olympus-GIF video endoscope was advanced without difficulty into the esophagus. Esophagus is somewhat tortuous. In the distal esophagus there is benign-appearing stricture. Illness was a small hiatal hernia with a lateral turn into the stomach. Gastric mucosa shows mild diffuse gastritis.  In the prepyloric area there is a 1.5 cm polyp that was snare removed with a hot polypectomy snare. Pylorus is patent. Normal bulb and post bulbar duodenum appeared normal. The endoscope was withdrawn and biopsies were obtained of the antrum to assess for Helicobacter. The endoscope was withdrawn and biopsies were obtained of the GE junction as well. After all biopsies were obtained the polyp was removed as outlined above. The endoscope was retroflexed. The proximal stomach shows mild diffuse gastritis. Endoscope was withdrawn. After the endoscope was withdrawn the patient with esophageal dilatation. 62 [place] Donis dilators carefully advanced through the oropharynx and guided into the esophagus provide symptomatic relief of dysphagia mild resistance was met with passage of the dilator. Complications: None  Estimated Blood Loss: minimal to none    Findings: Gastric polyp removed but not retrieved. Gastritis. Esophageal stricture. Hiatal hernia   Final Pathologic Diagnosis    A.          Gastric antrum, biopsy:        -          Antral-type gastric mucosa with no significant pathologic change.        B.          GE junction, biopsy:        -          Gastroesophageal junction mucosa with mild chronic non-active  carditis.        -          Negative for intestinal metaplasia or dysplasia.     Colonoscopy 2016 with Dr Nikki Valencia for h/o polyps      Assessment-#1 colonoscopy to cecum with scattered stool present and no active bleeding #2 small lesions could be missed because of poor prep #3 some sigmoid diverticula #4 prolapse of the rectum with over 1 cm size polypoid lesion noted partially hot snare and recovered    Plan--discussed with her friend afterwards and patient is to see me in a few weeks in the office and will wait the pathology report and further recommendations to follow  FINAL PATHOLOGICAL DIAGNOSIS:  A.  PREPYLORIC NODULE, BIOPSY:     -- FEATURES FAVORING HYPERPLASTIC POLYP.     -- NO DYSPLASIA OR MALIGNANCY IDENTIFIED. Lyric Rousseau POLYP, BIOPSY:     -- SQUAMOUS PAPILLOMA.     -- NO DYSPLASIA OR MALIGNANCY IDENTIFIED. C.  RECTUM, BIOPSY:     -- RETENTION POLYP.     -- NO DYSPLASIA OR MALIGNANCY IDENTIFIED. Admission Meds  No current facility-administered medications on file prior to encounter. Current Outpatient Medications on File Prior to Encounter   Medication Sig Dispense Refill    ferrous sulfate (IRON 325) 325 (65 Fe) MG tablet Take 325 mg by mouth 2 times daily (with meals)      carvedilol (COREG) 25 MG tablet Take 1 tablet by mouth 2 times daily 180 tablet 3    potassium chloride (KLOR-CON) 20 MEQ packet Take 10 mEq by mouth daily      dilTIAZem (CARDIZEM CD) 360 MG extended release capsule Take 1 capsule by mouth daily (Patient taking differently: Take 240 mg by mouth daily ) 180 capsule 3    valsartan (DIOVAN) 80 MG tablet Take 1 tablet by mouth daily 30 tablet 6    Biotin 10 MG tablet Take 10 mg by mouth daily      furosemide (LASIX) 20 MG tablet Take 1 tablet by mouth daily 30 tablet 5    apixaban (ELIQUIS) 5 MG TABS tablet Take 1 tablet by mouth 2 times daily 180 tablet 1     Takes ASA PRN pain - pt cannot tell me how often she needs this.      Allergies  Allergies   Allergen Reactions    Lisinopril Other (See Comments)     angioedema    Levothyroxine Sodium     Lyrica [Pregabalin]     Neurontin [Gabapentin]       Social   Social History     Tobacco Use    Smoking status: Never Smoker    Smokeless tobacco: Never Used   Substance Use Topics    Alcohol use: No        Family History   Problem Relation Age of Onset    Heart Disease Mother         Review of Systems  Constitutional: negative for fevers, chills, sweats    Ears, nose, mouth, throat, and face: negative for nasal congestion and sore throat   Respiratory: negative for cough and shortness of breath   Cardiovascular: negative for chest pain and dyspnea   Gastrointestinal: see hpi   Genitourinary:negative for dysuria and frequency   Integument/breast: negative for pruritus and rash   Hematologic/lymphatic: negative for bleeding and easy bruising   Musculoskeletal:negative for arthralgias and myalgias   Neurological: negative for dizziness and weakness         Physical Exam  Blood pressure (!) 142/91, pulse 66, temperature 97.4 °F (36.3 °C), temperature source Temporal, resp. rate 18, height 5' 3\" (1.6 m), weight 184 lb 1.4 oz (83.5 kg), SpO2 96 %. General appearance: alert, cooperative, no distress, appears stated age  Eyes: Anicteric  Head: Normocephalic, without obvious abnormality  Lungs: clear to auscultation bilaterally, Normal Effort  Heart: regular rate and rhythm, normal S1 and S2, no murmurs or rubs  Abdomen: soft, non-tender. Bowel sounds normal. No masses,  no organomegaly. Extremities: atraumatic, no cyanosis or edema  Skin: warm and dry, no jaundice  Neuro: Grossly intact, A&OX3  Musculoskeletal: 5/5  strength BUE      Data Review:    Recent Labs     11/12/20  0503   WBC 3.6*   HGB 10.8*   HCT 31.5*   MCV 94.7        Recent Labs     11/12/20  0503      K 3.6      CO2 26   BUN 28*   CREATININE 1.2     Recent Labs     11/12/20  0503   AST 48*   ALT 37   BILITOT <0.2   ALKPHOS 89     No results for input(s): LIPASE, AMYLASE in the last 72 hours. No results for input(s): PROTIME, INR in the last 72 hours. No results for input(s): PTT in the last 72 hours. No results for input(s): OCCULTBLD in the last 72 hours. Assessment:     Active Problems:    Acute renal failure (ARF) (HCC)  Resolved Problems:    * No resolved hospital problems. *    Anemia - hgb 10.8 which is slightly lower than baseline. labs from 1 year ago with hgb of 11. 9. had black stool a month ago which was likely from PO iron as it resolved with d/c of iron. Nausea without vomiting - for a few months. GERD - still with symptoms despite pepcid. Paroxysmal afib - currently off anticoagulation.      Recommendations:   -

## 2020-11-12 NOTE — CONSULTS
for cough, chest tightness, shortness of breath and wheezing. Cardiovascular: Negative for chest pain, palpitations and leg swelling. Gastrointestinal: Negative for abdominal pain, blood in stool, constipation, diarrhea, nausea and vomiting. Endocrine: Negative for cold intolerance, heat intolerance, polydipsia, polyphagia and polyuria. Genitourinary: Negative for dysuria, frequency and urgency. Neurological: Negative for dizziness, tremors, seizures and headaches. Psychiatric/Behavioral: Negative for confusion and hallucinations. PHYSICAL EXAM:      Vitals:    BP (!) 142/91   Pulse 66   Temp 97.4 °F (36.3 °C) (Temporal)   Resp 18   Ht 5' 3\" (1.6 m)   Wt 184 lb 1.4 oz (83.5 kg)   SpO2 96%   BMI 32.61 kg/m²   No intake/output data recorded. No intake/output data recorded. Physical Exam:    General : AAOx3, not in pain or respiratory distress, resting in bed  HEENT : normocephalic, atraumatic, mucosa moist, no palor or icterus  CVS: S1 S2 normal, regular rhythm, no murmurs or rubs. Lungs: Clear, no wheezing or crackles. Abd: Soft, bowel sounds normal, non-tender. Ext: No edema, no cyanosis  Skin: Warm. No rashes appreciated. : bladder non-distended, no tenderness over the bladder  Neuro: Alert and oriented x 3, nonfocal.  Joints: No erythema noted over joints.       DATA:    CBC with Differential:    Lab Results   Component Value Date    WBC 3.6 11/12/2020    RBC 3.33 11/12/2020    HGB 10.8 11/12/2020    HCT 31.5 11/12/2020     11/12/2020    MCV 94.7 11/12/2020    MCH 32.5 11/12/2020    MCHC 34.3 11/12/2020    RDW 19.6 11/12/2020    SEGSPCT 78 07/02/2014    LYMPHOPCT 18.4 11/12/2020    MONOPCT 6.0 11/12/2020    BASOPCT 2.1 11/12/2020    MONOSABS 0.2 11/12/2020    LYMPHSABS 0.7 11/12/2020    EOSABS 0.1 11/12/2020    BASOSABS 0.1 11/12/2020     BMP:    Lab Results   Component Value Date     11/12/2020    K 3.6 11/12/2020     11/12/2020    CO2 26 11/12/2020    BUN 28 11/12/2020    LABALBU 3.6 11/12/2020    CREATININE 1.2 11/12/2020    CALCIUM 9.1 11/12/2020    GFRAA 53 11/12/2020    LABGLOM 44 11/12/2020    GLUCOSE 154 11/12/2020     Ionized Calcium:  No results found for: IONCA  Magnesium:  No results found for: MG  Phosphorus:  No results found for: PHOS  U/A:    Lab Results   Component Value Date    COLORU YELLOW 09/11/2014    PROTEINU NEGATIVE 09/11/2014    WBCUA >100/HPF 09/11/2014    RBCUA 2-5/HPF 09/11/2014    BACTERIA +4 (>25/hpf) 09/11/2014    LEUKOCYTESUR MODERATE 09/11/2014    UROBILINOGEN 0.2 09/11/2014    BILIRUBINUR NEGATIVE 09/11/2014    GLUCOSEU NEGATIVE 09/11/2014       ASSESSMENT/PLAN:      1. Acute Kidney injury on CKD III  Base-line creatinine 0.96  Creatinine on admission was 1.6 and is improving. Will check renal US  Can continue Isotonic fluids  Will hold losartan for now    2. Hypokalemia  Can continue kcl    3. Hypertension not very well controlled  Hold losartan  On CCB too    4. H/o Atrial fibrillation rate controlled  On BB and CCB  5. Renal cyst  Renal US        Thank you for allowing me to participate in the care of this patient. I will continue to follow along. Please call with questions.     Gayathri Fuentes MD.  Office Phone : [phone]  11/12/2020

## 2020-11-13 ENCOUNTER — ANESTHESIA (OUTPATIENT)
Dept: ENDOSCOPY | Age: 75
DRG: 683 | End: 2020-11-13
Payer: MEDICARE

## 2020-11-13 ENCOUNTER — ANESTHESIA EVENT (OUTPATIENT)
Dept: ENDOSCOPY | Age: 75
DRG: 683 | End: 2020-11-13
Payer: MEDICARE

## 2020-11-13 VITALS
BODY MASS INDEX: 32.94 KG/M2 | DIASTOLIC BLOOD PRESSURE: 81 MMHG | SYSTOLIC BLOOD PRESSURE: 146 MMHG | WEIGHT: 185.9 LBS | HEIGHT: 63 IN | RESPIRATION RATE: 16 BRPM | OXYGEN SATURATION: 97 % | TEMPERATURE: 96.8 F | HEART RATE: 66 BPM

## 2020-11-13 VITALS — SYSTOLIC BLOOD PRESSURE: 122 MMHG | OXYGEN SATURATION: 98 % | DIASTOLIC BLOOD PRESSURE: 74 MMHG

## 2020-11-13 LAB
ANION GAP SERPL CALCULATED.3IONS-SCNC: 8 MMOL/L (ref 3–16)
BASOPHILS ABSOLUTE: 0.1 K/UL (ref 0–0.2)
BASOPHILS RELATIVE PERCENT: 1.6 %
BUN BLDV-MCNC: 20 MG/DL (ref 7–20)
CALCIUM SERPL-MCNC: 8 MG/DL (ref 8.3–10.6)
CHLORIDE BLD-SCNC: 105 MMOL/L (ref 99–110)
CO2: 26 MMOL/L (ref 21–32)
CORTISOL TOTAL: 6.7 UG/DL
CREAT SERPL-MCNC: 1 MG/DL (ref 0.6–1.2)
CREATININE URINE: 70.4 MG/DL (ref 28–259)
EOSINOPHILS ABSOLUTE: 0.1 K/UL (ref 0–0.6)
EOSINOPHILS RELATIVE PERCENT: 2.9 %
GFR AFRICAN AMERICAN: >60
GFR NON-AFRICAN AMERICAN: 54
GLUCOSE BLD-MCNC: 93 MG/DL (ref 70–99)
HCT VFR BLD CALC: 31.5 % (ref 36–48)
HEMOGLOBIN: 10.7 G/DL (ref 12–16)
INR BLD: 1.03 (ref 0.86–1.14)
LYMPHOCYTES ABSOLUTE: 0.7 K/UL (ref 1–5.1)
LYMPHOCYTES RELATIVE PERCENT: 15.7 %
MCH RBC QN AUTO: 31.7 PG (ref 26–34)
MCHC RBC AUTO-ENTMCNC: 33.9 G/DL (ref 31–36)
MCV RBC AUTO: 93.7 FL (ref 80–100)
MONOCYTES ABSOLUTE: 0.3 K/UL (ref 0–1.3)
MONOCYTES RELATIVE PERCENT: 8.1 %
NEUTROPHILS ABSOLUTE: 3 K/UL (ref 1.7–7.7)
NEUTROPHILS RELATIVE PERCENT: 71.7 %
PDW BLD-RTO: 20.4 % (ref 12.4–15.4)
PLATELET # BLD: 149 K/UL (ref 135–450)
PMV BLD AUTO: 10.3 FL (ref 5–10.5)
POTASSIUM SERPL-SCNC: 3.3 MMOL/L (ref 3.5–5.1)
PROTEIN PROTEIN: 8 MG/DL
PROTEIN/CREAT RATIO: 0.1 MG/DL
PROTHROMBIN TIME: 11.9 SEC (ref 10–13.2)
RBC # BLD: 3.36 M/UL (ref 4–5.2)
SODIUM BLD-SCNC: 139 MMOL/L (ref 136–145)
T4 FREE: 0.2 NG/DL (ref 0.9–1.8)
TSH REFLEX FT4: 112.1 UIU/ML (ref 0.27–4.2)
WBC # BLD: 4.2 K/UL (ref 4–11)

## 2020-11-13 PROCEDURE — 2720000010 HC SURG SUPPLY STERILE: Performed by: INTERNAL MEDICINE

## 2020-11-13 PROCEDURE — 7100000000 HC PACU RECOVERY - FIRST 15 MIN: Performed by: INTERNAL MEDICINE

## 2020-11-13 PROCEDURE — 3700000001 HC ADD 15 MINUTES (ANESTHESIA): Performed by: INTERNAL MEDICINE

## 2020-11-13 PROCEDURE — 84443 ASSAY THYROID STIM HORMONE: CPT

## 2020-11-13 PROCEDURE — 0D738ZZ DILATION OF LOWER ESOPHAGUS, VIA NATURAL OR ARTIFICIAL OPENING ENDOSCOPIC: ICD-10-PCS | Performed by: INTERNAL MEDICINE

## 2020-11-13 PROCEDURE — 80048 BASIC METABOLIC PNL TOTAL CA: CPT

## 2020-11-13 PROCEDURE — 0DB68ZX EXCISION OF STOMACH, VIA NATURAL OR ARTIFICIAL OPENING ENDOSCOPIC, DIAGNOSTIC: ICD-10-PCS | Performed by: INTERNAL MEDICINE

## 2020-11-13 PROCEDURE — 84439 ASSAY OF FREE THYROXINE: CPT

## 2020-11-13 PROCEDURE — 6360000002 HC RX W HCPCS: Performed by: NURSE ANESTHETIST, CERTIFIED REGISTERED

## 2020-11-13 PROCEDURE — 2580000003 HC RX 258: Performed by: ANESTHESIOLOGY

## 2020-11-13 PROCEDURE — 97161 PT EVAL LOW COMPLEX 20 MIN: CPT

## 2020-11-13 PROCEDURE — 7100000001 HC PACU RECOVERY - ADDTL 15 MIN: Performed by: INTERNAL MEDICINE

## 2020-11-13 PROCEDURE — 3609013500 HC EGD REMOVAL TUMOR POLYP/OTHER LESION SNARE TECH: Performed by: INTERNAL MEDICINE

## 2020-11-13 PROCEDURE — 3609012400 HC EGD TRANSORAL BIOPSY SINGLE/MULTIPLE: Performed by: INTERNAL MEDICINE

## 2020-11-13 PROCEDURE — 97530 THERAPEUTIC ACTIVITIES: CPT

## 2020-11-13 PROCEDURE — 3609013200 HC EGD W/ ABLATION: Performed by: INTERNAL MEDICINE

## 2020-11-13 PROCEDURE — 82533 TOTAL CORTISOL: CPT

## 2020-11-13 PROCEDURE — 2500000003 HC RX 250 WO HCPCS: Performed by: NURSE ANESTHETIST, CERTIFIED REGISTERED

## 2020-11-13 PROCEDURE — 36415 COLL VENOUS BLD VENIPUNCTURE: CPT

## 2020-11-13 PROCEDURE — 0DB38ZX EXCISION OF LOWER ESOPHAGUS, VIA NATURAL OR ARTIFICIAL OPENING ENDOSCOPIC, DIAGNOSTIC: ICD-10-PCS | Performed by: INTERNAL MEDICINE

## 2020-11-13 PROCEDURE — 99233 SBSQ HOSP IP/OBS HIGH 50: CPT | Performed by: INTERNAL MEDICINE

## 2020-11-13 PROCEDURE — 6370000000 HC RX 637 (ALT 250 FOR IP): Performed by: PHYSICIAN ASSISTANT

## 2020-11-13 PROCEDURE — C1726 CATH, BAL DIL, NON-VASCULAR: HCPCS | Performed by: INTERNAL MEDICINE

## 2020-11-13 PROCEDURE — 3700000000 HC ANESTHESIA ATTENDED CARE: Performed by: INTERNAL MEDICINE

## 2020-11-13 PROCEDURE — 88305 TISSUE EXAM BY PATHOLOGIST: CPT

## 2020-11-13 PROCEDURE — G0378 HOSPITAL OBSERVATION PER HR: HCPCS

## 2020-11-13 PROCEDURE — 2709999900 HC NON-CHARGEABLE SUPPLY: Performed by: INTERNAL MEDICINE

## 2020-11-13 PROCEDURE — 3609017700 HC EGD DILATION GASTRIC/DUODENAL STRICTURE: Performed by: INTERNAL MEDICINE

## 2020-11-13 PROCEDURE — 84481 FREE ASSAY (FT-3): CPT

## 2020-11-13 PROCEDURE — 85610 PROTHROMBIN TIME: CPT

## 2020-11-13 PROCEDURE — 85025 COMPLETE CBC W/AUTO DIFF WBC: CPT

## 2020-11-13 PROCEDURE — 6370000000 HC RX 637 (ALT 250 FOR IP): Performed by: INTERNAL MEDICINE

## 2020-11-13 PROCEDURE — 2580000003 HC RX 258: Performed by: INTERNAL MEDICINE

## 2020-11-13 RX ORDER — LOSARTAN POTASSIUM 25 MG/1
25 TABLET ORAL DAILY
Status: DISCONTINUED | OUTPATIENT
Start: 2020-11-13 | End: 2020-11-13 | Stop reason: HOSPADM

## 2020-11-13 RX ORDER — POTASSIUM CHLORIDE 20 MEQ/1
40 TABLET, EXTENDED RELEASE ORAL ONCE
Status: DISCONTINUED | OUTPATIENT
Start: 2020-11-13 | End: 2020-11-13 | Stop reason: HOSPADM

## 2020-11-13 RX ORDER — PROPOFOL 10 MG/ML
INJECTION, EMULSION INTRAVENOUS CONTINUOUS PRN
Status: DISCONTINUED | OUTPATIENT
Start: 2020-11-13 | End: 2020-11-13 | Stop reason: SDUPTHER

## 2020-11-13 RX ORDER — DILTIAZEM HYDROCHLORIDE 240 MG/1
240 CAPSULE, COATED, EXTENDED RELEASE ORAL NIGHTLY
Qty: 30 CAPSULE | Refills: 3 | Status: SHIPPED | OUTPATIENT
Start: 2020-11-13 | End: 2021-09-02 | Stop reason: SDUPTHER

## 2020-11-13 RX ORDER — SODIUM CHLORIDE 9 MG/ML
INJECTION, SOLUTION INTRAVENOUS CONTINUOUS
Status: DISCONTINUED | OUTPATIENT
Start: 2020-11-13 | End: 2020-11-13

## 2020-11-13 RX ORDER — PANTOPRAZOLE SODIUM 40 MG/1
40 TABLET, DELAYED RELEASE ORAL
Qty: 30 TABLET | Refills: 3 | Status: SHIPPED | OUTPATIENT
Start: 2020-11-14 | End: 2021-03-30 | Stop reason: SDUPTHER

## 2020-11-13 RX ORDER — LIDOCAINE HYDROCHLORIDE 20 MG/ML
INJECTION, SOLUTION EPIDURAL; INFILTRATION; INTRACAUDAL; PERINEURAL PRN
Status: DISCONTINUED | OUTPATIENT
Start: 2020-11-13 | End: 2020-11-13 | Stop reason: SDUPTHER

## 2020-11-13 RX ORDER — LEVOTHYROXINE SODIUM 0.1 MG/1
100 TABLET ORAL DAILY
Qty: 30 TABLET | Refills: 3 | Status: SHIPPED | OUTPATIENT
Start: 2020-11-14 | End: 2020-12-02

## 2020-11-13 RX ADMIN — PROPOFOL 100 MCG/KG/MIN: 10 INJECTION, EMULSION INTRAVENOUS at 08:13

## 2020-11-13 RX ADMIN — CARVEDILOL 25 MG: 25 TABLET, FILM COATED ORAL at 09:43

## 2020-11-13 RX ADMIN — POTASSIUM CHLORIDE 10 MEQ: 1500 TABLET, EXTENDED RELEASE ORAL at 09:47

## 2020-11-13 RX ADMIN — SODIUM CHLORIDE: 9 INJECTION, SOLUTION INTRAVENOUS at 07:00

## 2020-11-13 RX ADMIN — PANTOPRAZOLE SODIUM 40 MG: 40 TABLET, DELAYED RELEASE ORAL at 09:45

## 2020-11-13 RX ADMIN — SODIUM CHLORIDE: 9 INJECTION, SOLUTION INTRAVENOUS at 07:59

## 2020-11-13 RX ADMIN — Medication 10 ML: at 09:46

## 2020-11-13 RX ADMIN — LIDOCAINE HYDROCHLORIDE 100 MG: 20 INJECTION, SOLUTION EPIDURAL; INFILTRATION; INTRACAUDAL; PERINEURAL at 08:13

## 2020-11-13 ASSESSMENT — PULMONARY FUNCTION TESTS
PIF_VALUE: 0
PIF_VALUE: 9
PIF_VALUE: 1
PIF_VALUE: 1
PIF_VALUE: 0
PIF_VALUE: 1
PIF_VALUE: 0
PIF_VALUE: 1
PIF_VALUE: 1
PIF_VALUE: 0
PIF_VALUE: 1
PIF_VALUE: 0
PIF_VALUE: 1
PIF_VALUE: 1
PIF_VALUE: 0
PIF_VALUE: 7
PIF_VALUE: 1
PIF_VALUE: 1
PIF_VALUE: 0
PIF_VALUE: 0
PIF_VALUE: 1
PIF_VALUE: 0
PIF_VALUE: 0
PIF_VALUE: 1
PIF_VALUE: 0
PIF_VALUE: 0
PIF_VALUE: 1
PIF_VALUE: 0
PIF_VALUE: 0
PIF_VALUE: 1

## 2020-11-13 ASSESSMENT — PAIN - FUNCTIONAL ASSESSMENT: PAIN_FUNCTIONAL_ASSESSMENT: 0-10

## 2020-11-13 ASSESSMENT — PAIN SCALES - GENERAL
PAINLEVEL_OUTOF10: 2
PAINLEVEL_OUTOF10: 0
PAINLEVEL_OUTOF10: 3

## 2020-11-13 NOTE — PROGRESS NOTES
Patient Active Problem List   Diagnosis    Multiple sclerosis ([facility] [address]    Essential hypertension    Atrial fibrillation (HCC)    Hypothyroidism    Stenosis of right carotid artery    Hyperlipidemia    Acute renal failure (ARF) (HCC)    TIA (transient ischemic attack)   H&P dictated    discussed in detail with Dr Pili Reed Yes

## 2020-11-13 NOTE — PROGRESS NOTES
Nephrology Progress Note  [phone]  [phone]   [email or web address]    Patient:  Galdino Garcia   : 1945    Brief HPI    The patient is a 66 y. o.female with significant past medical history of Hypertension, Hyperlipidemia, Hypothyroidism, Atrial fibrillation not on AC, Multiple sclerosis, h/o Thyroidectomy presented to an OSH ED with dizziness and tinnitus. CT head neg for acute CVA. Was transferred here for cardiology eval.     We are consulted for JOSHUA  Her base-line creatinine appears to be around 1  Creatinine on admission was 1.6 and is better at 1.2 at the time of consult. H/o Poor po intake in the recent past because of nausea, emesis, no diarrhea or abdominal pain    Of note, on valsartan and lasix PTA    Subjective/Interval history  Pt seen and examined  Creatinine continues to improve  Hypokalemic this AM  Denies pain anywhere  S/p EGD this AM      Review of Systems   No chest pains or sob      SHx:  No visitors at the bed-side    Meds:  Scheduled Meds:   carvedilol  25 mg Oral BID WC    potassium chloride  10 mEq Oral Daily with breakfast    dilTIAZem  240 mg Oral Nightly    levothyroxine  100 mcg Oral Daily    pantoprazole  40 mg Oral QAM AC    sodium chloride flush  10 mL Intravenous 2 times per day     Continuous Infusions:   sodium chloride 100 mL/hr at 20 0759    sodium chloride 75 mL/hr at 20 0700     PRN Meds:.sodium chloride flush, ondansetron, acetaminophen      Vitals:  BP (!) 143/96   Pulse 78   Temp 97.8 °F (36.6 °C) (Temporal)   Resp 18   Ht 5' 3\" (1.6 m)   Wt 185 lb 14.4 oz (84.3 kg)   SpO2 95%   BMI 32.93 kg/m²       Physical Exam  General : AAOx3, not in pain or respiratory distress, resting in bed  HEENT : normocephalic, atraumatic, mucosa moist, no palor or icterus  CVS: S1 S2 normal, regular rhythm, no murmurs or rubs. Lungs: Clear, no wheezing or crackles. Abd: Soft, bowel sounds normal, non-tender. Ext: No edema, no cyanosis  Skin: Warm.   No omar galan. Labs:  CBC with Differential:    Lab Results   Component Value Date    WBC 4.2 11/13/2020    RBC 3.36 11/13/2020    HGB 10.7 11/13/2020    HCT 31.5 11/13/2020     11/13/2020    MCV 93.7 11/13/2020    MCH 31.7 11/13/2020    MCHC 33.9 11/13/2020    RDW 20.4 11/13/2020    SEGSPCT 78 07/02/2014    LYMPHOPCT 15.7 11/13/2020    MONOPCT 8.1 11/13/2020    BASOPCT 1.6 11/13/2020    MONOSABS 0.3 11/13/2020    LYMPHSABS 0.7 11/13/2020    EOSABS 0.1 11/13/2020    BASOSABS 0.1 11/13/2020     BMP:    Lab Results   Component Value Date     11/13/2020    K 3.3 11/13/2020     11/13/2020    CO2 26 11/13/2020    BUN 20 11/13/2020    LABALBU 3.6 11/12/2020    CREATININE 1.0 11/13/2020    CALCIUM 8.0 11/13/2020    GFRAA >60 11/13/2020    LABGLOM 54 11/13/2020    GLUCOSE 93 11/13/2020     Ionized Calcium:  No results found for: IONCA  Magnesium:  No results found for: MG  Phosphorus:  No results found for: PHOS    Assessment/Plan:    1. Acute Kidney injury on CKD III  Base-line creatinine 0.96  Creatinine on admission was 1.6, now close to base-line. Renal US reviewed. Can stop Isotonic fluids.        2. Hypokalemia  replete  Stop scheduled potassium supplements.      3. Hypertension not very well controlled  Resume low dose of losartan, has been on valsartan PTA     4. H/o Atrial fibrillation rate controlled  On BB and CCB    5. Renal cyst  Renal US with a 2.1 cm cyst upper pole right kidney    Plan  Replete K  Stop scheduled potassium supplements  resume losartan at a lower dose for better BP control  Can stop IVF  Ok to discharge from renal perspective    David Subramanian MD.  11/13/2020  Office Phone : [phone]    Thank you for allowing us to participate in the care of this pt. I willcontinue to follow along. Please call with questions or concerns.

## 2020-11-13 NOTE — PLAN OF CARE
Problem: Skin Integrity:  Goal: Will show no infection signs and symptoms  Description: Will show no infection signs and symptoms  Outcome: Ongoing    Patient is cleaned after incontinence episode, moisture barrier applied ,repositioned every two hours. Problem: Falls - Risk of:  Goal: Will remain free from falls  Description: Will remain free from falls  Outcome: Ongoing   Patient in bed with wheels locked in lowest position with alarm on, call light and belongings within reach.    Problem: Cardiovascular  Goal: Hemodynamic stability  Outcome: Ongoing     Vitals:    11/13/20 0032   BP: (!) 155/88   Pulse: 67   Resp: 16   Temp: 98.2 °F (36.8 °C)   SpO2: 97%

## 2020-11-13 NOTE — PROGRESS NOTES
HEMOCLIP    A HEMOCLIP METALLIC CLIPPING DEVICE HAS BEEN PLACED DURING YOUR PROCEDURE.     THIS DEVICE WILL NATURALLY PASS IN 7-14 DAYS WHEN MAKING A BOWEL MOVEMENT

## 2020-11-13 NOTE — BRIEF OP NOTE
Brief Postoperative Note - Full Note in Chart Review/Procedures tab       Patient: Sharda Caro  YOB: 1945  MRN: [id number]    Date of Procedure: 11/13/2020    Pre-Op Diagnosis: Black Stool    Post-Op Diagnosis: Same       Procedure(s):  EGD BIOPSY  EGD POLYP hot SNARE  EGD CONTROL HEMORRHAGE  EGD  ABLATION AVM  EGD DILATION BALLOON    Surgeon(s):  Beto Fields MD    Assistant:  * No surgical staff found *    Anesthesia: Monitor Anesthesia Care    Estimated Blood Loss (mL): Minimal    Complications: None    Specimens:   ID Type Source Tests Collected by Time Destination   A :  Tissue Stomach SURGICAL PATHOLOGY Beto Fields MD 11/13/2020 0825    B :  Tissue Esophagus SURGICAL PATHOLOGY Beto Fields MD 11/13/2020 0825        Implants:  * No implants in log *      Drains: * No LDAs found *    Findings:  1) Several distal esophageal erosions with mild stenosis - biopsied. 2) Distal esophageal balloon dilation to 20 mm performed. 3) Small hiatal hernia    4) Two 1 cm polyps in prepyloric antrum removed with hot snare. Polypectomy sites closed with three hemoclips. 5) Single non bleeding angioectasia cauterized with monopolar cautery and site closed with hemoclip. 6) Normal duodenum    Rec:  1) Regular diet. 2) OK to resume anticoagulation in AM 11/14. 3) Continue pantoprazole 40 mg daily. 4) I am willing to see in f/u as outpatient in 2 - 3 months. 5) Patient may call my office in 5 days for biopsy results. Will follow from a distance. Please call with questions.     Beto Fields MD  [address]  11/13/2020       Electronically signed by Beto Fields MD on 11/13/2020 at 8:37 AM

## 2020-11-13 NOTE — PROGRESS NOTES
Pt back from EGD, polyps and erosions treated and clipped, ok for pt to resume normal diet and to follow up with GI out pt.

## 2020-11-13 NOTE — PROGRESS NOTES
[address]   Electrophysiology Progress Note     Admit Date: 11/12/2020     Reason for follow up: Atrial fibrillation     HPI and Interval History: 66 y.o. female presented with generalized weakness, HTN, HLD, severe hypothyroidism, MS, CKD and persistent atrial fibrillation. Had black stool and came to hospital with generalized weakness. CT head negative . TSH very high. S/p EGD and balloon dilation and biopsy. Also cautery of angioectasia. GI ok with resuming anticoagulation tomorrow. Patient seen and examined. Clinical notes reviewed. Telemetry reviewed. Complains of extreme weakness. Would like to go home today. No major events overnight. Assessment and plan:     - Persistent atrial fibrillation:               Last ECG with sinus rhythm was on 11/21/2019. Patient ECG on 7/20/2020 with atrial fibrillation. It appears that she has been in atrial fibrillation since 7/2020 or before that. S/p EGD and biopsy and cauterization of angioectasia. Per GI she can take anticoagulation tomorrow. Start Eliquis tomorrow. Discussed ALVARO/cardioversion. She would like to go home. If she still in hospital by Monday we can arrange for ALVARO/cardioversion, otherwise cardioversion can be done after 4 weeks of full anticoagulation. Her rate is well controlled. - Sever hypothyroidism               Weakness, fatigue, LE swelling, dry skin               ? Reaction to generic thyroid hormone in the past.               Needs hormone replacement therapy.                Will defer to primary team.      HFpEF:               I/Os              Calculated H2FPEF score is:  7      Clinical Variable                                                                      Points  ====================================================  Body mass index > 30 kg/m2                                                 2  Two or more antihypertensive medicines 1  Atrial fibrillation                                                                       3  Echo with PA systolic pressure > 35 mmHg                          1  Age > 60 years                                                                        1  Doppler echo with E/e > 9                                                      1      Total points:                0          1          2          3          4          5          6          7          8     Probability of                     0.2      0.3   0.4  0.5  0.6    0.7    0.8         0.9      0.95        HFpEF              - Restoration of sinus rhythm is important               - Salt restriction < 2 grams/day     - JOSHUA:               Creatine follow up. - Anemia and dark stool ? GI bleeding               Off Eliquis. GI evaluation for whether she can james anticoagulation.      - Obesity: Body mass index is 32.61 kg/m². - HTN: controlled.      Discussed with nursing staff. Discussed with referring physician. Diagnostic studies:     ECG: Atrial fibrillation, controlled rate   Echo:      EF: 33%, Diastolic dysfunction, MAC present, Mild MR. Moderate AI.      TSH: 120        I independently reviewed the cardiac diagnostic studies, ECG and relevant imaging studies.      Physical Examination:  Vitals:    20 1115   BP: (!) 146/81   Pulse: 66   Resp:    Temp:    SpO2:       In: 1260 [I.V.:1260]  Out: -    Wt Readings from Last 3 Encounters:   20 185 lb 14.4 oz (84.3 kg)   20 170 lb (77.1 kg)   20 172 lb (78 kg)     Temp  Av.3 °F (36.3 °C)  Min: 96.4 °F (35.8 °C)  Max: 98.2 °F (36.8 °C)  Pulse  Av.3  Min: 62  Max: 81  BP  Min: 110/72  Max: 171/110  SpO2  Av.7 %  Min: 71 %  Max: 100 %  FiO2   Av.2 %  Min: 21 %  Max: 100 %    Intake/Output Summary (Last 24 hours) at 2020 1448  Last data filed at 2020 0835  Gross per 24 hour   Intake 1260 ml   Output --   Net 1260 ml       I independently reviewed all cardiac tracing from cardiac telemetry. · Constitutional: Oriented. No distress. · Head: Normocephalic and atraumatic. · Mouth/Throat: Oropharynx is clear and moist.   · Eyes: Conjunctivae normal. EOM are normal.   · Neck: Neck supple. No JVD present. · Cardiovascular: Normal rate, Irregular rhythm, S1&S2. · Pulmonary/Chest: Bilateral respiratory sounds. No rhonchi. · Abdominal: Soft. No tenderness. · Musculoskeletal: No tenderness. No edema    · Lymphadenopathy: Has no cervical adenopathy. · Neurological: Alert and oriented. Follows command, No Gross deficit   · Skin: Skin is dry. · Psychiatric: Has a normal behavior     Scheduled Meds:   potassium chloride  40 mEq Oral Once    losartan  25 mg Oral Daily    carvedilol  25 mg Oral BID WC    dilTIAZem  240 mg Oral Nightly    levothyroxine  100 mcg Oral Daily    pantoprazole  40 mg Oral QAM AC    sodium chloride flush  10 mL Intravenous 2 times per day     Continuous Infusions:  PRN Meds:sodium chloride flush, ondansetron, acetaminophen     Prior to Admission medications    Medication Sig Start Date End Date Taking?  Authorizing Provider   apixaban (ELIQUIS) 5 MG TABS tablet Take 1 tablet by mouth 2 times daily 11/14/20  Yes Madison Preston MD   dilTIAZem (CARDIZEM CD) 240 MG extended release capsule Take 1 capsule by mouth nightly 11/13/20  Yes Madison Preston MD   levothyroxine (SYNTHROID) 100 MCG tablet Take 1 tablet by mouth Daily 11/14/20  Yes Madison Preston MD   pantoprazole (PROTONIX) 40 MG tablet Take 1 tablet by mouth every morning (before breakfast) 11/14/20  Yes Madison Preston MD   ferrous sulfate (IRON 325) 325 (65 Fe) MG tablet Take 325 mg by mouth 2 times daily (with meals)   Yes Historical Provider, MD   carvedilol (COREG) 25 MG tablet Take 1 tablet by mouth 2 times daily 7/20/20  Yes Micaela Laurent MD   potassium chloride (KLOR-CON) 20 MEQ packet Take 10 mEq by mouth daily Historical Provider, MD   valsartan (DIOVAN) 80 MG tablet Take 1 tablet by mouth daily 9/9/20   Hayden Null MD   Biotin 10 MG tablet Take 10 mg by mouth daily    Historical Provider, MD       Review of System:  [x] Full ROS obtained and negative except as mentioned in HPI    Relevant and available labs, and cardiovascular diagnostics reviewed. Reviewed. Recent Labs     11/12/20 0503 11/13/20 0426    139   K 3.6 3.3*    105   CO2 26 26   BUN 28* 20   CREATININE 1.2 1.0     Recent Labs     11/12/20  0503 11/13/20  0426   WBC 3.6* 4.2   HGB 10.8* 10.7*   HCT 31.5* 31.5*   MCV 94.7 93.7    149     Estimated Creatinine Clearance: 48 mL/min (based on SCr of 1 mg/dL). No results found for: BNP    I independently reviewed all cardiac tracing from cardiac telemetry. I independently reviewed relevant and available cardiac diagnostic tests ECG, CXR, Echo, Stress test, Device interrogation, Holter, CT scan. Outside medical records via Care everywhere reviewed and summarized in H&P above. Complex medical condition with multiple medical problems affecting prognosis and outcome of EP interventions  Severe exacerbation of underlying medical condition requiring hospitalization and at risk of decompensation. I have spent 35 minutes of face to face time with the patient with more than 50% spent counseling and coordinating care for this patient      Thank you for allowing me to participate in the care of Lisa Kennedy     All questions and concerns were addressed to the patient/family. Alternatives to my treatment were discussed. I have discussed the above stated plan and the patient verbalized understanding and agreed with the plan. NOTE: This report was transcribed using voice recognition software. Every effort was made to ensure accuracy, however, inadvertent computerized transcription errors may be present.      Clearance MD Sameera, MPH  [address]   Office: [phone]

## 2020-11-13 NOTE — PROGRESS NOTES
Patient transferred back to room 3364. With assist transferred from stretcher back to bed. Bedside report given to [address] [place] [address]. All questions and concerns addressed. Status stable at this time. VSS.

## 2020-11-13 NOTE — PROGRESS NOTES
Patients floor 3 notified of patients return to [phone]. Patient doing well and meets discharge criteria for return to room. Status stable. Tucker Swartz

## 2020-11-13 NOTE — PROGRESS NOTES
Dr. Caro Bonnerdale in at bedside and spoke with patient in regards to his findings. All questions and concerns addressed. Patient awake and talkative. VSS. Taking ice chips without problems.

## 2020-11-13 NOTE — PROGRESS NOTES
Physical Therapy    Facility/Department: [address]  Initial Assessment    NAME: Nydia Rose  : 1945  MRN: [id number]    Date of Service: 2020    Discharge Recommendations:Keily Peñaloza scored a 16/24 on the AM-PAC short mobility form. Current research shows that an AM-PAC score of 18 or greater is typically associated with a discharge to the patient's home setting. Based on the patient's AM-PAC score and their current functional mobility deficits, it is recommended that the patient have 2-3 sessions per week of Physical Therapy at d/c to increase the patient's independence. At this time, this patient demonstrates the endurance and safety to discharge home with home services and a follow up treatment frequency of 2-3x/wk. Please see assessment section for further patient specific details. If patient discharges prior to next session this note will serve as a discharge summary. Please see below for the latest assessment towards goals. HOME HEALTH CARE: LEVEL 1 STANDARD    - Initial home health evaluation to occur within 24-48 hours, in patient home   - Therapy to evaluate with goal of regaining prior level of functioning   - Therapy to evaluate if patient has [address] needs for personal care        PT Equipment Recommendations  Equipment Needed: No    Assessment   Body structures, Functions, Activity limitations: Decreased functional mobility ; Decreased ADL status; Decreased ROM; Decreased strength;Decreased endurance;Decreased balance  Assessment: Pt presents as below her baseline function and would benefit from skilled PT services to promote safe return to PLOF. Prognosis: Good  Decision Making: Low Complexity  PT Education: Goals;PT Role;Plan of Care  REQUIRES PT FOLLOW UP: Yes  Activity Tolerance  Activity Tolerance: Patient Tolerated treatment well       Patient Diagnosis(es): There were no encounter diagnoses.      has a past medical history of Hyperlipidemia, Hypertension, Hypothyroidism, MS (multiple sclerosis) ([facility] [address] and Pituitary tumor. has a past surgical history that includes Stomach surgery; Thyroidectomy; Upper gastrointestinal endoscopy (N/A, 11/13/2020); Upper gastrointestinal endoscopy (N/A, 11/13/2020); Upper gastrointestinal endoscopy (N/A, 11/13/2020); and Upper gastrointestinal endoscopy (N/A, 11/13/2020). Restrictions  Restrictions/Precautions  Restrictions/Precautions: Fall Risk(High fall risk)  Required Braces or Orthoses?: No  Position Activity Restriction  Other position/activity restrictions: Patient is a 66 y.o.  female admitted with Acute renal failure (ARF) ([facility] [address] N17.9 who is seen in consult for h/o black stool. H/o MS. H/o paroxysmal afib. Was on Eliquis but stopped taking this a few weeks ago d/t nausea. She presented to outside ED with weakness and dizziness. CT head neg for acute CVA. Was transferred here for cardiology eval. Cardiology planning resuming an anticoagulant if no GI contraindication.   Vision/Hearing  Vision: Impaired  Vision Exceptions: Wears glasses for reading  Hearing: Within functional limits     Subjective  General  Chart Reviewed: Yes  Response To Previous Treatment: Not applicable  Family / Caregiver Present: No  Diagnosis: acute renal failure  Follows Commands: Within Functional Limits  General Comment  Comments: pt. supine in bed upon arrival  Subjective  Subjective: pt. agreeable to PT eval.  Pain Screening  Patient Currently in Pain: Denies  Vital Signs  Patient Currently in Pain: Denies       Orientation  Orientation  Overall Orientation Status: Within Normal Limits  Social/Functional History  Social/Functional History  Lives With: Other (comment)(girl lives downstairs and will assist her if needed)  Type of Home: House  Home Layout: Performs ADL's on one level, One level, Able to Live on Main level with bedroom/bathroom  Home Access: Ramped entrance  Bathroom Shower/Tub: Tub/Shower unit, Walk-in shower(currently has tub shower that does not work, getting a walk in shower installed. 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   Cognition        Objective     Observation/Palpation  Posture: Good    PROM RLE (degrees)  RLE PROM: WFL  AROM RLE (degrees)  RLE AROM: WFL  Strength RLE  Strength RLE: Exception  Comment: global LE weakness--due to MS  Strength LLE  Strength LLE: Exception  Comment: global LE weakness--due to MS  Tone RLE  RLE Tone: Normotonic  Tone LLE  LLE Tone: Normotonic  Motor Control  Gross Motor?: WFL  Sensation  Overall Sensation Status: WFL  Bed mobility  Supine to Sit: Stand by assistance(HOB elevated, increased time)  Scooting: Stand by assistance  Transfers  Stand Pivot Transfers: Contact guard assistance  Ambulation  Ambulation?: No  Stairs/Curb  Stairs?: No     Balance  Posture: Good  Sitting - Static: Good  Sitting - Dynamic: Good  Standing - Static: Poor  Standing - Dynamic: Poor        Plan   Plan  Times per week: 3-5x  Times per day: Daily  Current Treatment Recommendations: Strengthening, ROM, Balance Training, Functional Mobility Training, Transfer Training, Endurance Training, Gait Training, Safety Education & Training, Patient/Caregiver Education & Training  Safety Devices  Type of devices: All fall risk precautions in place, Call light within reach, Chair alarm in place, Gait belt, Patient at risk for falls, Left in chair, Nurse notified  Restraints  Initially in place: No    G-Code       OutComes Score                                                  AM-PAC Score  AM-PAC Inpatient Mobility Raw Score : 16 (11/13/20 1608)  AM-PAC Inpatient T-Scale Score : 40.78 (11/13/20 1608)  Mobility Inpatient CMS 0-100% Score: 54.16 (11/13/20 1608)  Mobility Inpatient CMS G-Code Modifier : CK (11/13/20 1608)          Goals  Short term goals  Time Frame for Short term goals:  To be met prior to discharge  Short term goal 1: Pt will complete bed mobility with mod I  Short term goal 2: Pt will complete stand pivot transfer with mod I       Therapy Time   Individual Concurrent Group Co-treatment   Time In 1330         Time Out 1400         Minutes 30         Timed Code Treatment Minutes: 13 Minutes     Maribell Pineda, PT   Maribell Pineda, PT, DPT, [phone]

## 2020-11-13 NOTE — PROGRESS NOTES
Reviewed patient's medical and surgical history in electronic record and with patient at the bedside. All questions regarding procedure answered. Scope number and equipment verified using a two person system.      Electronically signed by Janeth Jay RN on 11/13/2020 at 8:02 AM

## 2020-11-13 NOTE — H&P
[facility]                     [facility], [address]                              HISTORY AND PHYSICAL    PATIENT NAME: Anurag Villalobos                  :        1945  MED REC NO:   [id number]                          ROOM:       0011  ACCOUNT NO:   [de-identified]                           ADMIT DATE: 2020  PROVIDER:     Basia Dumont MD    HISTORY OF PRESENT ILLNESS:  The patient is a 47-year-old white lady,  patient of Dr. Yanni Torrez, sees Dr Berny Ahmadi initially went to [facility] ER but due  to Dr. Taylor Bran. She  came to the Emergency Room at [facility], earlier wernt to _____ [facility] with increased generalized lethargy and symptoms suggestive of  TIA. She was having poor appetite. There is some associated shortness  of breath. No angina pectoris. The patient was having increased  malaise and lethargy _____ because her regular cardiologist here is Dr. Berny Ahmadi, who only attends to [facility], she chose to be  transferred to [facility].  In fact is was at the  suggestion of Dr. Berny Ahmadi that she came to the emergency room. She is  known to be noncompliant with medication. PAST MEDICAL HISTORY:  Pertinent for pituitary tumor, multiple  sclerosis, hypothyroidism, hypertension, hyperlipidemia, paroxysmal  atrial fibrillation. She was also started on oral anticoagulation,  which was _well tolerated but expensive ____. PAST SURGICAL HISTORY:  Pertinent for stomach surgery and thyroidectomy. FAMILY HISTORY:  Her mother is alive, has atherosclerotic heart disease. Her father is  of natural causes. MEDICATIONS:  The patient is on carvedilol, Cardizem, Synthroid,  Protonix and potassium chloride. ALLERGIES:  She is allergic to LISINOPRIL, LEVOTHYROXINE, LYRICA and  GABAPENTIN. SOCIAL HISTORY:  She is a , she has two children. She quit smoking  long time ago.   She would have an occasional alcoholic beverage. 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 There is no history of substance  abuse. REVIEW OF SYSTEMS:  Negative for _____, loss of consciousness, _____  generalized weakness, malaise and fatigue. No recent weight loss. No  angina pectoris. No orthopnea. No paroxysmal nocturnal dyspnea. No  abdominal pain. The patient recently has had melanotic stool. Denies  any abdominal pain, no weight loss. Does have chronic musculoskeletal  pain. Does have some lower extremity edema. No speech disturbance. No  motor weakness. PHYSICAL EXAMINATION:  GENERAL:  Alert, awake, oriented x3, pleasant 68-year-old white lady,  appears to be in no acute distress. VITAL SIGNS:  Her temperature is 97, blood pressure 153/89, respirations  18, heart rate 54, O2 sat is 97% on room air. HEENT:  Oral mucosa dry. NECK:  No lymphadenopathy. No thyromegaly. LUNGS:  Vesicular breath sounds. Few basilar crackles. HEART:  Regular rate and rhythm. S1, S2. No tachycardia. No gallop  rhythm. ABDOMEN:  Soft, nontender. Bowel sounds present. EXTREMITIES:  Show trace edema. NEUROLOGICAL:  Generalized neuromuscular weakness with poor  coordination. No speech disturbance. No facial asymmetry. LABORATORY DATA:  Lab evaluation, the patient had fairly extensive  workup in [facility] ER. Her cardiac enzymes, troponin was less than  0.01, white blood cell count 4000, hemoglobin and hematocrit 11 and 33  and MCV was 94.3. Monocyte 0.2. Platelet count 276. TSH level is 120. Blood sugar is 108. Sodium 139, potassium 3.4, chloride 103, calcium  9.1, albumin 3.1. AST and ALT are 57 each. Alkaline phosphatase is 86. CO2 is 30. Anion gap is 6. Other result showed EKG, atrial fibrillation with controlled ventricular  rate.     The patient had vascular Doppler venous in the lower extremity,  bilateral DVT studies, _no____ superficial venous thrombosis involving the  right lower extremity and left lower extremity. Head CT at [facility] shows no evidence of acute intracranial findings,  age-related atrophy with __cerebrovascular ___ ventricular white matter changes. 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 ASSESSMENT:  TIA, atrial fibrillation, _____ prerenal azotemia, anemia  of chronic disease, paroxysmal atrial fibrillation, questionable GI  bleed with history of melena, _____ the patient may be a candidate for  anticoagulation therapy. PLAN:  We discussed the patient __with ___ Dr. Beth Small. We will get a GI  consult. Dr. Beth Small will be seeing the patient. The patient also needs  to be seen by EP/Cardiology group, Dr. Lauren Gar for possible cardioversion  or even ablation. The patient needs to be on anticoagulation. Thank you very much. As always, it is a pleasure and privilege to take  care of your patients at [facility].         Steve Rodriguez MD    D: 11/12/2020 21:19:30       T: 11/13/2020 1:28:21     [email or web address]  Job#: [id number]     Doc#: [id number]    CC:  Presley Angeles MD

## 2020-11-13 NOTE — ANESTHESIA POSTPROCEDURE EVALUATION
Department of Anesthesiology  Postprocedure Note    Patient: Yuosif Francisco  MRN: [id number]  [place]: 1945  Date of evaluation: 11/13/2020  Time:  1:48 PM     Procedure Summary     Date:  11/13/20 Room / Location:  [address] [place] [address]    Anesthesia Start:  0800 Anesthesia Stop:  1208    Procedures:       EGD BIOPSY (N/A Abdomen)      EGD POLYP hot SNARE (N/A Abdomen)      EGD  ABLATION AVM (N/A Abdomen)      EGD DILATION BALLOON (N/A Abdomen) Diagnosis:  (Black Stool)    Surgeon:  Patricia Thibodeaux MD Responsible Provider:  Maria Victoria Crockett MD    Anesthesia Type:  MAC ASA Status:  4          Anesthesia Type: MAC    Maxine Phase I: Maxine Score: 10    Maxine Phase II:      Last vitals: Reviewed and per EMR flowsheets.        Anesthesia Post Evaluation    Patient location during evaluation: PACU  Complications: no  Cardiovascular status: hemodynamically stable  Respiratory status: acceptable

## 2020-11-13 NOTE — ANESTHESIA PRE PROCEDURE
Department of Anesthesiology  Preprocedure Note       Name:  See Smith   Age:  66 y.o.  :  1945                                          MRN:  [id number]         Date:  2020      Surgeon: Lo Baeza):  Sukumar Petersen MD    Procedure: Procedure(s):  EGD DIAGNOSTIC ONLY    Medications prior to admission:   Prior to Admission medications    Medication Sig Start Date End Date Taking?  Authorizing Provider   ferrous sulfate (IRON 325) 325 (65 Fe) MG tablet Take 325 mg by mouth 2 times daily (with meals)   Yes Historical Provider, MD   carvedilol (COREG) 25 MG tablet Take 1 tablet by mouth 2 times daily 20  Yes Viky Renee MD   potassium chloride (KLOR-CON) 20 MEQ packet Take 10 mEq by mouth daily    Historical Provider, MD   dilTIAZem (CARDIZEM CD) 360 MG extended release capsule Take 1 capsule by mouth daily  Patient taking differently: Take 240 mg by mouth daily  9/15/20   Viky Renee MD   valsartan (DIOVAN) 80 MG tablet Take 1 tablet by mouth daily 20   Viky Renee MD   Biotin 10 MG tablet Take 10 mg by mouth daily    Historical Provider, MD   furosemide (LASIX) 20 MG tablet Take 1 tablet by mouth daily 20   SOLIS Farley CNP   apixaban (ELIQUIS) 5 MG TABS tablet Take 1 tablet by mouth 2 times daily 20   Viky Renee MD       Current medications:    Current Facility-Administered Medications   Medication Dose Route Frequency Provider Last Rate Last Dose    0.9 % sodium chloride infusion   Intravenous Continuous Umesh Alfaro MD 75 mL/hr at 20 0700      carvedilol (COREG) tablet 25 mg  25 mg Oral BID WC Umesh Alfaro MD   25 mg at 20 1748    potassium chloride (KLOR-CON M) extended release tablet 10 mEq  10 mEq Oral Daily with breakfast Umesh Alfaro MD        dilTIAZem (CARDIZEM CD) extended release capsule 240 mg  240 mg Oral Nightly Umesh Alfaro MD   240 mg at 20 2138    levothyroxine (SYNTHROID) tablet 100 mcg  100 mcg Oral Daily Carolann Schuster MD   100 mcg at 11/12/20 1332    pantoprazole (PROTONIX) tablet 40 mg  40 mg Oral QAM AC Debbie Walden PA-C   40 mg at 11/12/20 1539    sodium chloride flush 0.9 % injection 10 mL  10 mL Intravenous 2 times per day Carolann Schuster MD   10 mL at 11/12/20 2138    sodium chloride flush 0.9 % injection 10 mL  10 mL Intravenous PRN Carolann Schuster MD        [facility] injection 4 mg  4 mg Intravenous Q6H PRN Carolann Schuster MD        acetaminophen (TYLENOL) tablet 650 mg  650 mg Oral Q4H PRN Carolann Schuster MD           Allergies:     Allergies   Allergen Reactions    Clonidine Other (See Comments)     Swelling of tongue  Tongue       Lisinopril Other (See Comments)     angioedema    Baclofen Other (See Comments)     Feels high  Feels \"high\" per pt       Cefdinir Other (See Comments)     Feels \"high\" per pt  Feels \"high\" per pt       Citalopram Other (See Comments)     Feels \"high\" per pt     Citalopram Hydrobromide Other (See Comments)     Feels \"high\" per pt      Levofloxacin Nausea Only    Levothyroxine Sodium     Lyrica [Pregabalin]     Morphine And Related Other (See Comments)    Naproxen Sodium Other (See Comments)     Feels \"high\" per pt      Neurontin [Gabapentin]     Nitrofurantoin Other (See Comments)     Feels \"high\" per pt  Feels \"high\" per pt       Prednisone Other (See Comments)    Tolterodine Other (See Comments)       Problem List:    Patient Active Problem List   Diagnosis Code    Multiple sclerosis ([facility] [address] G35    Essential hypertension I10    Atrial fibrillation ([facility] [address] I48.91    Hypothyroidism E03.9    Stenosis of right carotid artery I65.21    Hyperlipidemia E78.5    Acute renal failure (ARF) ([facility] [address] N17.9    TIA (transient ischemic attack) G45.9    Melena K92.1       Past Medical History:        Diagnosis Date    Hyperlipidemia     Hypertension     Hypothyroidism     MS (multiple sclerosis) ([facility] [address]     Pituitary tumor        Past Surgical History:        Procedure Laterality Date    STOMACH SURGERY      THYROIDECTOMY         Social History:    Social History     Tobacco Use    Smoking status: Never Smoker    Smokeless tobacco: Never Used   Substance Use Topics    Alcohol use: No                                Counseling given: Not Answered      Vital Signs (Current):   Vitals:    11/12/20 1545 11/12/20 2130 11/13/20 0032 11/13/20 0449   BP: (!) 153/89 129/89 (!) 155/88 (!) 148/87   Pulse: 70 81 67 65   Resp: 18  16 16   Temp: 96.4 °F (35.8 °C) 97.8 °F (36.6 °C) 98.2 °F (36.8 °C) 98 °F (36.7 °C)   TempSrc: Temporal  Temporal Temporal   SpO2: 95% 96% 97% 98%   Weight:    185 lb 14.4 oz (84.3 kg)   Height:                                                  BP Readings from Last 3 Encounters:   11/13/20 (!) 148/87   10/08/20 124/66   09/09/20 130/80       NPO Status:                                                                                 BMI:   Wt Readings from Last 3 Encounters:   11/13/20 185 lb 14.4 oz (84.3 kg)   09/09/20 170 lb (77.1 kg)   08/26/20 172 lb (78 kg)     Body mass index is 32.93 kg/m². CBC:   Lab Results   Component Value Date    WBC 4.2 11/13/2020    RBC 3.36 11/13/2020    HGB 10.7 11/13/2020    HCT 31.5 11/13/2020    MCV 93.7 11/13/2020    RDW 20.4 11/13/2020     11/13/2020       CMP:   Lab Results   Component Value Date     11/13/2020    K 3.3 11/13/2020     11/13/2020    CO2 26 11/13/2020    BUN 20 11/13/2020    CREATININE 1.0 11/13/2020    GFRAA >60 11/13/2020    AGRATIO 1.1 11/12/2020    LABGLOM 54 11/13/2020    GLUCOSE 93 11/13/2020    PROT 7.0 11/12/2020    PROT 7.6 07/27/2012    CALCIUM 8.0 11/13/2020    BILITOT <0.2 11/12/2020    ALKPHOS 89 11/12/2020    AST 48 11/12/2020    ALT 37 11/12/2020       POC Tests: No results for input(s): POCGLU, POCNA, POCK, POCCL, POCBUN, POCHEMO, POCHCT in the last 72 hours.     Coags:   Lab Results   Component Value Date    PROTIME 11.9 11/13/2020    INR 1.03 11/13/2020       HCG (If Applicable): No results found for: PREGTESTUR, PREGSERUM, HCG, HCGQUANT     ABGs: No results found for: PHART, PO2ART, PTF9KGN, PWL6ZBM, BEART, [id number]     Type & Screen (If Applicable):  No results found for: LABABO, LABRH    Drug/Infectious Status (If Applicable):  No results found for: HIV, HEPCAB    COVID-19 Screening (If Applicable): No results found for: COVID19      Anesthesia Evaluation  Patient summary reviewed and Nursing notes reviewed  Airway: Mallampati: III        Dental:          Pulmonary:normal exam                               Cardiovascular:  Exercise tolerance: good (>4 METS),   (+) hypertension:, valvular problems/murmurs: MR and AI, hyperlipidemia      ECG reviewed  Rhythm: irregular    Echocardiogram reviewed               ROS comment: EF 55%     Neuro/Psych:   (+) neuromuscular disease: multiple sclerosis,             GI/Hepatic/Renal: Neg GI/Hepatic/Renal ROS  (+) morbid obesity          Endo/Other:    (+) hypothyroidism, hyperthyroidism, blood dyscrasia: anemia and anticoagulation therapy, arthritis:., electrolyte abnormalities, . ROS comment: Pituitary tumor;iatrogenic hyperthyroid,but stopped taking meds several weeks ago and was restarted several days ago. Abdominal:   (+) obese,         Vascular:   + PVD, aortic or cerebral, DVT, . Anesthesia Plan      MAC     ASA 4     (Will check TSH/FT4;will supplement steroid if needed. Patient not really allergic to Prednisone but describes moon  Facies. Patient has been off thyroid meds for several weeks according to her.)  Induction: intravenous. Anesthetic plan and risks discussed with patient. Plan discussed with CRNA.               Tanner Hicks MD   11/13/2020

## 2020-11-13 NOTE — PROGRESS NOTES
Data- discharge order received, pt verbalized agreement to discharge, disposition to previous residence, no needs for HHC/DME because pt already set up with own [address] Action- discharge instructions prepared and given to pt, pt verbalized understanding. Medication information packet given r/t NEW and/or CHANGED prescriptions emphasizing name/purpose/side effects, pt verbalized understanding. Discharge instruction summary: Diet- Cardiac, Activity- as zachery, Primary Care Physician as follows: Pawel Meza -[phone] f/u appointment with in one week. Response- Pt belongings gathered, IV removed. Disposition is home (no HHC/DME needs), transported with belongings, taken to discharge lounge via w/c w/ nurse no complications.

## 2020-11-13 NOTE — PROGRESS NOTES
Patient arrived from ENDO to PACU spot 6. Attached to monitoring system. Report received per CRNA and ENDO nurse. Patient had no problems during procedure. Status stable . Arrived a bit drowsy. Will continue to observe closely.

## 2020-11-13 NOTE — PROGRESS NOTES
VSS. .  GI and nephrology signed off. Msged Dr. Wade Oliva, need Cardiology to sign off for discharge. Msged cardiology.

## 2020-11-16 LAB
T3 FREE: 0.5 PG/ML (ref 2.3–4.2)
T4 TOTAL: 1.4 UG/DL (ref 4.5–10.9)

## 2020-11-17 NOTE — ADDENDUM NOTE
Addendum  created 11/17/20 1736 by Niels Lopez MD    Clinical Note Signed, Review and Sign - Ready for Procedure

## 2020-11-19 LAB
CORTISOL TOTAL: 14 UG/DL
TSH SERPL DL<=0.05 MIU/L-ACNC: 124.2 UIU/ML (ref 0.27–4.2)

## 2020-11-19 NOTE — PROGRESS NOTES
Upon chart review in Care Everywhere, it wasa detrermined that patient was admitted to [facility] ICU on 11/15/2020 for myxedema coma. I have spoken with Dr Kerby Lennox who is the attending MD who updated me on patient status. She is being repleted with IV T4 and steroids and is doing well. Her elevated TroponinT levels were not felt to indicate MI. They have yet to decide on type of thyroid supplement that patient will be sent home on partly due to her compliance issues in past.

## 2020-11-19 NOTE — ADDENDUM NOTE
Addendum  created 11/19/20 1424 by Maria Victoria Crockett MD    Clinical Note Signed, Order list changed

## 2020-11-20 LAB — TOTAL CK: 379 U/L (ref 26–192)

## 2020-12-02 ENCOUNTER — OFFICE VISIT (OUTPATIENT)
Dept: CARDIOLOGY CLINIC | Age: 75
End: 2020-12-02
Payer: MEDICARE

## 2020-12-02 VITALS — HEART RATE: 100 BPM | SYSTOLIC BLOOD PRESSURE: 110 MMHG | OXYGEN SATURATION: 99 % | DIASTOLIC BLOOD PRESSURE: 60 MMHG

## 2020-12-02 PROCEDURE — 93000 ELECTROCARDIOGRAM COMPLETE: CPT | Performed by: INTERNAL MEDICINE

## 2020-12-02 PROCEDURE — 99214 OFFICE O/P EST MOD 30 MIN: CPT | Performed by: INTERNAL MEDICINE

## 2020-12-02 RX ORDER — VANCOMYCIN HYDROCHLORIDE 125 MG/1
CAPSULE ORAL
COMMUNITY
Start: 2020-11-25 | End: 2021-03-30 | Stop reason: ALTCHOICE

## 2020-12-02 RX ORDER — LEVOTHYROXINE SODIUM 0.12 MG/1
125 TABLET ORAL DAILY
Qty: 30 TABLET | Refills: 6
Start: 2020-12-02 | End: 2020-12-02

## 2020-12-02 RX ORDER — FUROSEMIDE 20 MG/1
20 TABLET ORAL DAILY
Qty: 60 TABLET | Refills: 6
Start: 2020-12-02 | End: 2021-04-20 | Stop reason: SDUPTHER

## 2020-12-02 RX ORDER — AMOXICILLIN AND CLAVULANATE POTASSIUM 875; 125 MG/1; MG/1
1 TABLET, FILM COATED ORAL EVERY 12 HOURS
COMMUNITY
Start: 2020-11-25 | End: 2020-12-05

## 2020-12-02 RX ORDER — ATORVASTATIN CALCIUM 10 MG/1
10 TABLET, FILM COATED ORAL NIGHTLY
COMMUNITY
Start: 2020-11-25 | End: 2021-03-30 | Stop reason: ALTCHOICE

## 2020-12-02 RX ORDER — FUROSEMIDE 20 MG/1
20 TABLET ORAL DAILY
COMMUNITY
End: 2020-12-02

## 2020-12-02 RX ORDER — FOLIC ACID 1 MG/1
TABLET ORAL
COMMUNITY
Start: 2020-11-25 | End: 2021-03-30 | Stop reason: ALTCHOICE

## 2020-12-02 RX ORDER — LEVOTHYROXINE SODIUM 0.12 MG/1
125 TABLET ORAL DAILY
Qty: 30 TABLET | Refills: 6
Start: 2020-12-02

## 2020-12-02 NOTE — PROGRESS NOTES
[address]   Cardiac Evaluation      Patient: Sharda Caro  YOB: 1945  Date: 8/10/15       Chief Complaint   Patient presents with    Atrial Fibrillation    Hypertension    Hyperlipidemia        Referring provider: Dayna Rodriguez MD    History of Present Illness:   Ms Alison Rojas is seen today for follow up to recent hospitalizations. She is treated for hypertension and paroxysmal atrial fibrillation. She continues to struggle with multiple sclerosis. Cruz Westbrook states in August, 2011 she had abdominal surgery with Dr Todd Morales for a paraesophageal hernia with Nissen fundoplication and a G tube. March, 2014 she had angioedema from lisinopril. She has been seen often recently due to recurrent AF with RVR that I initially thought was due to iatrogenic hyperthyroidism. When her AF returned I started Eliquis. Initially she told me it caused lethargy and wouldn't take it but ultimately relented. She has had ongoing issues with diarrhea and black stools. Because of transportation issues, she had not seen GI as an outpatient. Eliquis was subsequently stopped due to black stools. On 11/11/20 she went to [facility] ER w/ HR in the 30's, fatigue, and weakness. She had asked to be transferred to [facility]. Cruz Westbrook was found to be in ARF w/ severe hypothyroidism. She was seen at [facility] by Dr Leah Teresa and it was recommended she undergo GI workup. Dr Chelle Maurice saw her and performed EGD. It was determined she could restart Eliquis. Dr Leah Teresa spoke w/ her about ALVARO and CV. She did not want to stay in the hospital, so was discharged home 11/13/20. Cruz Westbrook then presented to [facility] on 11/15/20 w/ profound weakness, altered mental status, and severe hypothyroidism w/ TSH >100. Trops were elevated and she was seen by cardiology for NSTEMI. hgb on admission was 11.4, then on 11/21/20 hgb dropped to 6.7. She was transfused on the 20th and 21st. She had repeat EGD with Dr Margie Quiles w/ gastric ulcer shown. She was released on Lasix 20mg daily and Eliquis BID. She is on 2 ATB's for UTI after having catheter in the hospital.     Today, Ms Carmella Bravo states she is taking Lasix every other day. She states this is because she urinates too much. She states Eliquis is causing diarrhea. She thinks she ran out of it, as well. Grzegorz Vargas saw pcp yesterday and is currently taking Synthroid 125mcg daily. She is supposed to have repeat TSH in January. Her legs are swollen. She is here with a friend today. Grzegorz Vargas states she is able to transfer herself, but cannot get in bed by herself. Her lift chair is currently not working. A neighbor is helping her get in bed at night. PT is set up to come to her house next week, as well as meals on wheels. She refused in pt rehab at both [facility] and [facility]. Past Medical History:   has a past medical history of Hyperlipidemia, Hypertension, Hypothyroidism, MS (multiple sclerosis) ([address] and Pituitary tumor. Surgical History:  Abdominal surgery, ankle surgery    Social History:  History     Social History    Marital Status:      Spouse Name: N/A     Number of Children: Stephanie Hernandez Years of Education: N/A     Occupational History    Western artist     Social History Main Topics    Smoking status: Never Smoker     Smokeless tobacco: Not on file    Alcohol Use: No    Drug Use: No    Sexually Active:       . 2 children who are alive and well. Other Topics Concern    Not on file     Social History Narrative    No narrative on file       Family History:  family history includes Heart Disease in her mother. Allergies:  Clonidine; Lisinopril; Baclofen; Cefdinir; Citalopram; Citalopram hydrobromide; Levofloxacin; Levothyroxine sodium; Lyrica [pregabalin]; Morphine and related; Naproxen sodium; Neurontin [gabapentin]; Nitrofurantoin; Prednisone; and Tolterodine     Review of Systems:   · Constitutional: there has been no unanticipated weight loss.  Weak and fatigued   · Eyes: No equal   Abdomen:  · No masses or tenderness  · Normal bowel sounds  Neurological/Psychiatric:  · Alert and oriented x3  · Very weak. In a wheel chair    Assessment/Plan  1. Essential hypertension -well controlled    2. Atrial fibrillation -paroxysmal, EKG>atrial fibrillation w/ controlled rate-She has had AF continuously since being found to be hyperthyroid on armour thyroid. Her T4 levels remained high for a number of weeks despite her allegedly not taking it. She then missed some appts and was admitted to [facility] and was severely hypothyroid with a TSH of 120. She was started on Synthroid BEBETO in the hospital and after she left [facility] she was readmitted to [facility] and received some IV and remains on Synthroid but she says she has 2 different strengths (?). I told her to take the 125 mcg. (See below also)   Echo 6/19/20 EF 55% with mild MR, sclerotic AV, moderate AR  EKG 11/20/19>sinus rhythm, first degree AV block, PAC's, HR 61bpm  EKG 8/29/19>sinus rhythm, first degree AV block, HR 56bpm -Coreg decreased   Echo 4/24/14: [facility]) EF 55-60%, mild cLVH,  Moderate AR, mild AS, mildly dilated ascending aorta  Echo 1/9/04: normal left ventricular wall motion and contractility, may be diastolic dysfunction of left ventricle, no significant valvular abnormalities seen. Left atrial size normal     3. Carotid stenosis -stable  Doppler 4/44: 8-84% LICA, 69-38% KATIUSKA  5. Hypothyroidism -10/16: TSH 9.66, intolerance? to Synthroid and was  on [place] Thyroid. Previously referred to Dr Chirag Ha. She was instructed to stop [place] Thyroid after TSH 0.173 and free T4 6.97 on labs 7/23/20. Labs were repeated 8/13/20 and TSH 0.405 w/ free T4 3.55. she says she restarted [place] Thyroid recently because she feels better on it. Instructed, again at last visit, to stop as it can cause heart rhythm irregularities. Repeat TSH 10/6/20: 0.438, T4 5.24  TSH 11/13/20>112, she is now taking Synthroid 100mcg daily    6.  GIB - gastric ulcers and she is now on protonix. She is also on iron. 7. Diarrhea - she has had this intermittently in the past and now having more which she attributes to Eliquis. I suspect it is more likely due to the vanc and augmentin, which she is on for 4 more days. .   I told her to take the eliquis as RXd. (NO reports of diarrhea with eliquis on the drug information website). PLAN:  Repeat CBC, BMP, TSH, and T4 in 2 weeks. She has been advised to take meds as prescribed. Scribe's attestation: This note was scribed in the presence of Dr Bear Dawson MD by Layla Breen RN. The scribe's documentation has been prepared under my direction and personally reviewed by me in its entirety. I confirm that the note above accurately reflects all work, treatment, procedures, and medical decision making performed by me. Thank you for allowing to me to participate in the care of WESTSTEPHANY Nessa.

## 2020-12-02 NOTE — LETTER
[address] Cardiology [facility] [place] [facility] [address]  Phone: [phone]  Fax: [phone]    Rosario Vizcaino MD        December 4, 2020     Kristel Croft, [address] [facility] [place] [address]    Patient: Giana Romo  MR Number: [id number]  YOB: 1945  Date of Visit: 12/2/2020    Dear Dr. Kristel Croft:    [address]   Cardiac Evaluation      Patient: Giana Romo  YOB: 1945  Date: 8/10/15       Chief Complaint   Patient presents with    Atrial Fibrillation    Hypertension    Hyperlipidemia        Referring provider: Kristle Croft MD    History of Present Illness:   Ms Alvaro Antunez is seen today for follow up to recent hospitalizations. She is treated for hypertension and paroxysmal atrial fibrillation. She continues to struggle with multiple sclerosis. Steffanie Bower states in August, 2011 she had abdominal surgery with Dr Deyvi Nguyen for a paraesophageal hernia with Nissen fundoplication and a G tube. March, 2014 she had angioedema from lisinopril. She has been seen often recently due to recurrent AF with RVR that I initially thought was due to iatrogenic hyperthyroidism. When her AF returned I started Eliquis. Initially she told me it caused lethargy and wouldn't take it but ultimately relented. She has had ongoing issues with diarrhea and black stools. Because of transportation issues, she had not seen GI as an outpatient. Eliquis was subsequently stopped due to black stools. On 11/11/20 she went to [facility] ER w/ HR in the 30's, fatigue, and weakness. She had asked to be transferred to [facility]. Steffanie Bower was found to be in ARF w/ severe hypothyroidism. She was seen at [facility] by Dr Rody Graf and it was recommended she undergo GI workup. Dr Gordo Dumont saw her and performed EGD. It was determined she could restart Eliquis. Dr Rody Graf spoke w/ her about ALVARO and CV.  She did not want to stay in the hospital, so was discharged home Allergies:  Clonidine; Lisinopril; Baclofen; Cefdinir; Citalopram; Citalopram hydrobromide; Levofloxacin; Levothyroxine sodium; Lyrica [pregabalin]; Morphine and related; Naproxen sodium; Neurontin [gabapentin]; Nitrofurantoin; Prednisone; and Tolterodine     Review of Systems:   · Constitutional: there has been no unanticipated weight loss. Weak and fatigued   · Eyes: No visual changes   · ENT: No Headaches, hearing loss or vertigo. No mouth sores or sore throat. · Cardiovascular: Reviewed in HPI  · Respiratory: No cough or wheezing, no sputum production. No hematemesis. · Gastrointestinal: No abdominal pain, blood in stools. She has chronic diarrhea and does not have an appetite. She now has black stools but is on iron tabs. · Genitourinary: No nocturia, dysuria, trouble voiding  · Musculoskeletal:  No gait disturbance, weakness or joint complaints. · Integumentary: No rash or pruritis. · Neurological: No headache, change in muscle strength, numbness or tingling. MS is worse this year  · Psychiatric: No anxiety or depression  · Endocrine: No malaise or fever  · Hematologic/Lymphatic: No abnormal bruising or bleeding, blood clots or swollen lymph nodes. · Allergic/Immunologic: No nasal congestion or hives. Physical Examination:    Vitals:    12/02/20 1113   BP: 110/60   Site: Left Upper Arm   Position: Sitting   Cuff Size: Medium Adult   Pulse: 100   SpO2: 99%     There is no height or weight on file to calculate BMI. Wt Readings from Last 3 Encounters:   11/13/20 185 lb 14.4 oz (84.3 kg)   09/09/20 170 lb (77.1 kg)   08/26/20 172 lb (78 kg)     BP Readings from Last 3 Encounters:   12/02/20 110/60   11/13/20 (!) 146/81   11/13/20 122/74        Constitutional and General Appearance:  appears stated age, in a wheelchair, pale and edematous.   Respiratory:  · Normal excursion and expansion without use of accessory muscles  · Resp Auscultation: Normal breath sounds without dullness  Cardiovascular: · The apical impulses not displaced  · Heart is irregular rate and rhythm with normal S1, S2; rates are better controlled. · The carotid upstroke is normal, bruit noted   · JVP is not elevated  · Peripheral pulses are symmetrical  · There is no clubbing, cyanosis of the extremities  · 3+ edema BLE up to her knees   · Femoral Arteries: 2+ and equal  · Pedal Pulses: 2+ and equal   Abdomen:  · No masses or tenderness  · Normal bowel sounds  Neurological/Psychiatric:  · Alert and oriented x3  · Very weak. In a wheel chair    Assessment/Plan  1. Essential hypertension -well controlled    2. Atrial fibrillation -paroxysmal, EKG>atrial fibrillation w/ controlled rate-She has had AF continuously since being found to be hyperthyroid on armour thyroid. Her T4 levels remained high for a number of weeks despite her allegedly not taking it. She then missed some appts and was admitted to [facility] ER and was severely hypothyroid with a TSH of 120. She was started on Synthroid BEBETO in the hospital and after she left [address] she was readmitted to [facility] and received some IV and remains on Synthroid but she says she has 2 different strengths (?). I told her to take the 125 mcg. (See below also)   Echo 6/19/20 EF 55% with mild MR, sclerotic AV, moderate AR  EKG 11/20/19>sinus rhythm, first degree AV block, PAC's, HR 61bpm  EKG 8/29/19>sinus rhythm, first degree AV block, HR 56bpm -Coreg decreased   Echo 4/24/14: [facility]) EF 55-60%, mild cLVH,  Moderate AR, mild AS, mildly dilated ascending aorta  Echo 1/9/04: normal left ventricular wall motion and contractility, may be diastolic dysfunction of left ventricle, no significant valvular abnormalities seen. Left atrial size normal     3. Carotid stenosis -stable  Doppler 6/09: 5-97% LICA, 51-03% KATIUSKA  5. Hypothyroidism -10/16: TSH 9.66, intolerance? to Synthroid and was  on [place] Thyroid. Previously referred to Dr Chirag Ha.  She was instructed to stop [place] Thyroid after TSH 0.173 and free T4 6.97 on labs 7/23/20. Labs were repeated 8/13/20 and TSH 0.405 w/ free T4 3.55. she says she restarted [place] Thyroid recently because she feels better on it. Instructed, again at last visit, to stop as it can cause heart rhythm irregularities. Repeat TSH 10/6/20: 0.438, T4 5.24  TSH 11/13/20>112, she is now taking Synthroid 100mcg daily    6. GIB - gastric ulcers and she is now on protonix. She is also on iron. 7. Diarrhea - she has had this intermittently in the past and now having more which she attributes to Eliquis. I suspect it is more likely due to the vanc and augmentin, which she is on for 4 more days. .   I told her to take the eliquis as RXd. (NO reports of diarrhea with eliquis on the drug information website). PLAN:  Repeat CBC, BMP, TSH, and T4 in 2 weeks. She has been advised to take meds as prescribed. Scribe's attestation: This note was scribed in the presence of Dr Dinesh Skaggs MD by Radha Hooker RN. The scribe's documentation has been prepared under my direction and personally reviewed by me in its entirety. I confirm that the note above accurately reflects all work, treatment, procedures, and medical decision making performed by me. Thank you for allowing to me to participate in the care of LIEN Szymanski. If you have questions, please do not hesitate to call me. I look forward to following Mol along with you.     Sincerely,        Alec Martinez MD

## 2020-12-17 NOTE — PROGRESS NOTES
Patient seen , discharge dictated scripts given , arrangements made , GINA completed .  Discussed with nursing staff  And   If applicable ,  Discussed with  Patient's family , all questions answered and concerns addressed  When applicable

## 2020-12-18 NOTE — DISCHARGE SUMMARY
[address]                     [address] [facility], [address]                               DISCHARGE SUMMARY    PATIENT NAME: Monse Mckeon                  :        1945  MED REC NO:   [id number]                          ROOM:       1849  ACCOUNT NO:   [de-identified]                           ADMIT DATE: 2020  PROVIDER:     Valentino Little MD                  DISCHARGE DATE:  2020    FINAL DIAGNOSES:  1. TIA. 2.  Atrial fibrillation. 3.  Prerenal azotemia. 4.  Anemia of chronic disease. 5.  Paroxysmal atrial fibrillation. 6.  Questionable GI bleed with history of melena. DISCHARGE MEDICATIONS:  1.  Eliquis 5 mg p.o. b.i.d.  2.  Cardizem  once a day. 3.  Protonix 40 mg once a day. 4.  Synthroid 100 mcg once a day. 5.  Iron 325 twice a day. 6.  Coreg 25 twice a day. 7.  Potassium chloride 10 mEq daily. 8.  Cardizem  once a day. 9.  Diovan 80 mg once a day. 10.  Biotin 10 mg once a day. HOSPITAL COURSE:  This elderly woman, a patient of Dr. Owen Cruz  came to the emergency room as a transfer from [facility] ER. Because  Dr. Verónica Ho does not go to [facility] and the patient _____ interested  being taking care of by her, we accepted the transfer. The patient had  increased generalized lethargy symptoms suggestive of TIA. She was  having poor appetite with some associated shortness of breath without  any angina pectoris. The patient does have variable compliance in Dr. Daylin Magaña opinion in the past.  Temperature 97, blood pressure 153/89,  respiration 18, heart rate was 54, O2 sat was 97% in room air. Lungs  showed fairly clear. Heart was regular rate and rhythm. No evidence of  atrial fibrillation at this time.   The patient had fairly extensive  workup in the [facility] ER with cardiac enzymes, troponin was less than 0.01,  white blood cell count 4000, hemoglobin and hematocrit were 11 and 33, platelet count 369. TSH level was 120. The patient has not been taking  her thyroid pills. Other results showed EKG with atrial fibrillation  with controlled ventricular rate. The patient had vascular Doppler in  the lower extremities, bilateral DVT study without any evidence of  superficial or deep thrombosis. Head CT at the [facility] showed no  evidence of acute intracranial findings. There was age-related atrophy  with cerebrovascular periventricular white matter disease. Remote  infarct anterior limb of the right internal capsule and left corona  radiata. The patient was treated for TIA. GI consultation was  obtained. Dr. Hayley Pfeiffer will also be seeing the patient. The patient was  referred to [facility] for possible cardioversion or even  ablation. The patient needed to be on anticoagulation. The patient was  started first on Eliquis. The patient was stabilized. GI consultation  was obtained. Dr. Boris Sheffield saw the patient and he did an EGD for  questionable melanotic stool. He attributed that to iron therapy. The  patient had EGD polyp that was treated with hot snare and ablation of AV  malformation and dilatation of the balloon. The patient was resumed on  regular diet and it was okay to start her on anticoagulant in about five  days. The patient was discharged in stable condition. We can resume  the oral anticoagulation on 11/14/2020 according to our note.         Faustina Boles MD    D: 12/17/2020 0:56:58       T: 12/17/2020 5:02:42     [id number]  Job#: [id number]     Doc#: [id number]    CC:

## 2021-01-29 ENCOUNTER — OFFICE VISIT (OUTPATIENT)
Dept: CARDIOLOGY CLINIC | Age: 76
End: 2021-01-29
Payer: MEDICARE

## 2021-01-29 VITALS — SYSTOLIC BLOOD PRESSURE: 120 MMHG | HEART RATE: 91 BPM | DIASTOLIC BLOOD PRESSURE: 80 MMHG | OXYGEN SATURATION: 99 %

## 2021-01-29 DIAGNOSIS — E78.49 OTHER HYPERLIPIDEMIA: ICD-10-CM

## 2021-01-29 DIAGNOSIS — I10 ESSENTIAL HYPERTENSION: ICD-10-CM

## 2021-01-29 DIAGNOSIS — I48.91 ATRIAL FIBRILLATION, UNSPECIFIED TYPE (HCC): Primary | ICD-10-CM

## 2021-01-29 PROCEDURE — 99214 OFFICE O/P EST MOD 30 MIN: CPT | Performed by: INTERNAL MEDICINE

## 2021-01-29 NOTE — PROGRESS NOTES
[address]   Cardiac Evaluation      Patient: Aashish Alarcon  YOB: 1945  Date: 8/10/15       Chief Complaint   Patient presents with    Atrial Fibrillation    Hypertension    Hyperlipidemia        Referring provider: Jayde Oneill MD    History of Present Illness:   Ms Meri Hart is seen today for follow up. She is treated for hypertension and paroxysmal atrial fibrillation. She continues to struggle with multiple sclerosis which is getting worse. Martina Villanueva states in August, 2011 she had abdominal surgery with Dr Jeffery De Jesus for a paraesophageal hernia with Nissen fundoplication and a G tube. March, 2014 she had angioedema from lisinopril. She has been seen often due to recurrent AF with RVR that I initially thought was due to iatrogenic hyperthyroidism. When her AF returned I started Eliquis. Initially she told me it caused lethargy and wouldn't take it but ultimately relented. She has had ongoing issues with diarrhea and black stools. Because of transportation issues, she had not seen GI as an outpatient. Eliquis was subsequently stopped due to black stools but ultimately she had a GI work up and is now back on it despite complaining that it also caused diarrhea. On 11/11/20 she went to [facility] ER w/ HR in the 30's, fatigue, and weakness. She had asked to be transferred to [facility]. Martina Villanueva was found to be in ARF w/ severe hypothyroidism. She was seen at [facility] by Dr Xavier Penny and it was recommended she undergo GI workup. Dr Jamarcus Alexis saw her and performed EGD. It was determined she could restart Eliquis. Dr Xavier Penny spoke w/ her about ALVARO and CV. She did not want to stay in the hospital, so was discharged home 11/13/20. Martina Villanueva then presented to [facility] on 11/15/20 w/ profound weakness, altered mental status, and severe hypothyroidism w/ TSH >100. Trops were elevated and she was seen by cardiology for NSTEMI. hgb on admission was 11.4, then on 11/21/20 hgb dropped to 6.7.  She was sputum production. No hematemesis. · Gastrointestinal: No abdominal pain, blood in stools. She has chronic diarrhea and does not have an appetite. She now has black stools but is on iron tabs. · Genitourinary: No nocturia, dysuria, trouble voiding  · Musculoskeletal:  No gait disturbance, weakness or joint complaints. · Integumentary: No rash or pruritis. · Neurological: No headache, change in muscle strength, numbness or tingling. MS is worse this year  · Psychiatric: No anxiety or depression  · Endocrine: No malaise or fever  · Hematologic/Lymphatic: No abnormal bruising or bleeding, blood clots or swollen lymph nodes. · Allergic/Immunologic: No nasal congestion or hives. Physical Examination:    Vitals:    01/29/21 1140   BP: 120/80   Site: Left Upper Arm   Position: Sitting   Cuff Size: Medium Adult   Pulse: 91   SpO2: 99%     There is no height or weight on file to calculate BMI. Wt Readings from Last 3 Encounters:   11/13/20 185 lb 14.4 oz (84.3 kg)   09/09/20 170 lb (77.1 kg)   08/26/20 172 lb (78 kg)     BP Readings from Last 3 Encounters:   01/29/21 120/80   12/02/20 110/60   11/13/20 (!) 146/81        Constitutional and General Appearance:  appears stated age, in a wheelchair, pale and edematous. Respiratory:  · Normal excursion and expansion without use of accessory muscles  · Resp Auscultation: bilateral rales in the bases  Cardiovascular:  · The apical impulses not displaced  · Heart is irregularly irregular rhythm with normal S1, S2; rates are better controlled. · The carotid upstroke is normal, bruit noted   · JVP is not elevated  · Peripheral pulses are symmetrical  · There is no clubbing, cyanosis of the extremities  · 3+ edema LLE up to her knee, slightly less on the right   · Femoral Arteries: 2+ and equal  · Pedal Pulses: 2+ and equal   Abdomen:  · No masses or tenderness  · Normal bowel sounds  Neurological/Psychiatric:  · Alert and oriented x3  · Very weak.    In a wheel chair iron.     7. Diarrhea - she has had this intermittently in the past and now having more which she attributes to Eliquis. I suspect it is more likely due to the vanc and augmentin, which she is on for 4 more days. .   I told her to take the eliquis as RXd. (NO reports of diarrhea with eliquis on the drug information website). 8. Diastolic heart failure - she has had chf in the past and is now refusing to take lasix until she gets a catheter. She has primarily  LE edema but also a few basilar rales with O2 sat of 99%. I have told her she should take her lasix. PLAN:  Advised to get a pulse oximeter and check her O2 sats regularly. Scribe's attestation: This note was scribed in the presence of Dr Homar Portillo MD by Corine Alex RN. The scribe's documentation has been prepared under my direction and personally reviewed by me in its entirety. I confirm that the note above accurately reflects all work, treatment, procedures, and medical decision making performed by me. Thank you for allowing to me to participate in the care of LIEN Szymanski.

## 2021-02-15 RX ORDER — POTASSIUM CHLORIDE 750 MG/1
10 TABLET, FILM COATED, EXTENDED RELEASE ORAL DAILY
Qty: 90 TABLET | Refills: 1 | Status: SHIPPED | OUTPATIENT
Start: 2021-02-15 | End: 2021-03-30

## 2021-03-01 RX ORDER — DILTIAZEM HYDROCHLORIDE 240 MG/1
CAPSULE, EXTENDED RELEASE ORAL
Qty: 90 CAPSULE | Refills: 2 | Status: SHIPPED | OUTPATIENT
Start: 2021-03-01 | End: 2021-03-30

## 2021-03-19 ENCOUNTER — TELEPHONE (OUTPATIENT)
Dept: CARDIOLOGY CLINIC | Age: 76
End: 2021-03-19

## 2021-03-19 NOTE — TELEPHONE ENCOUNTER
Amilcar [facility] staff off today. I spoke to Mol. She needs an afternoon time slot due to ride issues. Please switch appt from 3/25/2021 to 3/30/2021 at 2:30. No need to call patient as she has already agreed to the switch. Thank you.

## 2021-03-30 ENCOUNTER — OFFICE VISIT (OUTPATIENT)
Dept: CARDIOLOGY CLINIC | Age: 76
End: 2021-03-30
Payer: COMMERCIAL

## 2021-03-30 VITALS
SYSTOLIC BLOOD PRESSURE: 110 MMHG | HEIGHT: 64 IN | DIASTOLIC BLOOD PRESSURE: 80 MMHG | OXYGEN SATURATION: 98 % | WEIGHT: 170 LBS | BODY MASS INDEX: 29.02 KG/M2 | RESPIRATION RATE: 20 BRPM | HEART RATE: 63 BPM

## 2021-03-30 DIAGNOSIS — I10 ESSENTIAL HYPERTENSION: ICD-10-CM

## 2021-03-30 DIAGNOSIS — E03.8 OTHER SPECIFIED HYPOTHYROIDISM: ICD-10-CM

## 2021-03-30 DIAGNOSIS — I50.43 ACUTE ON CHRONIC COMBINED SYSTOLIC AND DIASTOLIC CONGESTIVE HEART FAILURE (HCC): ICD-10-CM

## 2021-03-30 DIAGNOSIS — E78.49 OTHER HYPERLIPIDEMIA: ICD-10-CM

## 2021-03-30 DIAGNOSIS — I48.91 ATRIAL FIBRILLATION, UNSPECIFIED TYPE (HCC): Primary | ICD-10-CM

## 2021-03-30 PROCEDURE — 99214 OFFICE O/P EST MOD 30 MIN: CPT | Performed by: INTERNAL MEDICINE

## 2021-03-30 RX ORDER — SPIRONOLACTONE 25 MG/1
25 TABLET ORAL DAILY
Qty: 30 TABLET | Refills: 5 | Status: SHIPPED | OUTPATIENT
Start: 2021-03-30 | End: 2021-05-26 | Stop reason: SINTOL

## 2021-03-30 RX ORDER — TIZANIDINE 2 MG/1
2 TABLET ORAL EVERY 6 HOURS PRN
COMMUNITY

## 2021-03-30 RX ORDER — NITROFURANTOIN 25; 75 MG/1; MG/1
CAPSULE ORAL
COMMUNITY
Start: 2021-03-09 | End: 2021-09-08

## 2021-03-30 RX ORDER — PANTOPRAZOLE SODIUM 40 MG/1
40 TABLET, DELAYED RELEASE ORAL
Qty: 30 TABLET | Refills: 3 | Status: SHIPPED | OUTPATIENT
Start: 2021-03-30 | End: 2021-05-03

## 2021-03-30 SDOH — HEALTH STABILITY: MENTAL HEALTH: HOW MANY STANDARD DRINKS CONTAINING ALCOHOL DO YOU HAVE ON A TYPICAL DAY?: 1 OR 2

## 2021-03-30 NOTE — PROGRESS NOTES
[address]   Cardiac Evaluation      Patient: Charley Juárez  YOB: 1945  Date: 8/10/15       Chief Complaint   Patient presents with    Follow-Up from [facility]    Atrial Fibrillation    Hyperlipidemia        Referring provider: Ariadna Ryan MD    History of Present Illness:   Ms Manuel Reinoso is seen today for follow up to recent hospitalization for CHF. She is treated for hypertension and paroxysmal atrial fibrillation. She continues to struggle with multiple sclerosis which is getting worse. Shelley Jeronmio states in August, 2011 she had abdominal surgery with Dr Mat Aponte for a paraesophageal hernia with Nissen fundoplication and a G tube. March, 2014 she had angioedema from lisinopril. She has been seen often due to recurrent AF with RVR that I initially thought was due to iatrogenic hyperthyroidism. When her AF returned I started Eliquis. Initially she told me it caused lethargy and wouldn't take it but ultimately relented. She has had ongoing issues with diarrhea and black stools. Because of transportation issues, she had not seen GI as an outpatient. Eliquis was subsequently stopped due to black stools but ultimately she had a GI work up and is now back on it despite complaining that it also caused diarrhea. On 11/11/20 she went to [facility] ER w/ HR in the 30's, fatigue, and weakness. She had asked to be transferred to [facility]. Shelley Jeronimo was found to be in ARF w/ severe hypothyroidism. She was seen at [facility] by Dr Radha Avitia and it was recommended she undergo GI workup. Dr Gabriela Kwong saw her and performed EGD. It was determined she could restart Eliquis. Dr Radha Avitia spoke w/ her about ALVARO and CV. She did not want to stay in the hospital, so was discharged home 11/13/20. Shelley Jeronimo then presented to [facility] on 11/15/20 w/ profound weakness, altered mental status, and severe hypothyroidism w/ TSH >100.  Trops were elevated and she was seen by cardiology for NSTEMI. hgb on admission was 11.4, then on 11/21/20 hgb dropped to 6.7. She was transfused on the 20th and 21st. She had repeat EGD with Dr Karen Elias w/ gastric ulcer shown. She was released on Lasix 20mg daily and Eliquis BID. Today, Ms Emerson Nance is here with her friend. She was hospitalized 3/15/21 at [facility] with acute CHF. She was admitted with increased shortness of breath. She underwent an echo and was diuresed with IV Lasix. She states she is now taking Lasix, but was previously reluctant due to frequent urination. She states her breathing is better since hospitalization. Orbonnie Escobar denies any chest pain, palpitations, dizziness. Past Medical History:   has a past medical history of Hyperlipidemia, Hypertension, Hypothyroidism, MS (multiple sclerosis) ([address] and Pituitary tumor. Surgical History:  Abdominal surgery, ankle surgery    Social History:  History     Social History    Marital Status:      Spouse Name: N/A     Number of Children: Jessie Tavarez Years of Education: N/A     Occupational History    Western artist     Social History Main Topics    Smoking status: Never Smoker     Smokeless tobacco: Not on file    Alcohol Use: No    Drug Use: No    Sexually Active:       . 2 children who are alive and well. Other Topics Concern    Not on file     Social History Narrative    No narrative on file       Family History:  family history includes Heart Disease in her mother. Allergies:  Clonidine, Lisinopril, Baclofen, Cefdinir, Citalopram, Citalopram hydrobromide, Levofloxacin, Levothyroxine sodium, Lyrica [pregabalin], Morphine and related, Naproxen sodium, Neurontin [gabapentin], Nitrofurantoin, Prednisone, and Tolterodine     Review of Systems:   · Constitutional: there has been no unanticipated weight loss. LE extremities are weak  · Eyes: No visual changes   · ENT: No Headaches, hearing loss or vertigo. No mouth sores or sore throat.   · Cardiovascular: Reviewed in HPI  · Respiratory: No cough or wheezing, no sputum production. No hematemesis. · Gastrointestinal: No abdominal pain, blood in stools. She has chronic diarrhea and does not have an appetite. She now has black stools but is on iron tabs. · Genitourinary: No nocturia, dysuria, trouble voiding  · Musculoskeletal:  No gait disturbance, weakness or joint complaints. · Integumentary: No rash or pruritis. · Neurological: No headache, change in muscle strength, numbness or tingling. MS is worse this year  · Psychiatric: No anxiety or depression  · Endocrine: No malaise or fever  · Hematologic/Lymphatic: No abnormal bruising or bleeding, blood clots or swollen lymph nodes. · Allergic/Immunologic: No nasal congestion or hives. Physical Examination:    Vitals:    03/30/21 1436   BP: 110/80   Site: Left Upper Arm   Position: Sitting   Cuff Size: Medium Adult   Pulse: 63   Resp: 20   SpO2: 98%   Weight: 170 lb (77.1 kg)   Height: 5' 4\" (1.626 m)     Body mass index is 29.18 kg/m². Wt Readings from Last 3 Encounters:   03/30/21 170 lb (77.1 kg)   11/13/20 185 lb 14.4 oz (84.3 kg)   09/09/20 170 lb (77.1 kg)     BP Readings from Last 3 Encounters:   03/30/21 110/80   01/29/21 120/80   12/02/20 110/60        Constitutional and General Appearance:  appears stated age, in a wheelchair, pale and edematous. Respiratory:  · Normal excursion and expansion without use of accessory muscles  · Resp Auscultation: bilateral rales in the bases  Cardiovascular:  · The apical impulses not displaced  · Heart is irregularly irregular rhythm with normal S1, S2; rates are better controlled.    · The carotid upstroke is normal, bruit noted   · JVP is not elevated  · Peripheral pulses are symmetrical  · There is no clubbing, cyanosis of the extremities  · 3+ edema LLE up to her knee  · Femoral Arteries: 2+ and equal  · Pedal Pulses: 2+ and equal   Abdomen:  · No masses or tenderness  · Normal bowel sounds  Neurological/Psychiatric:  · Alert and oriented x3  · Very weak.   In a wheel chair    Assessment/Plan  1. Essential hypertension -well controlled, b/p's 120's-130's with HR 80's    2. Atrial fibrillation -paroxysmal, She has had AF continuously since being found to be hyperthyroid on armour thyroid. Her T4 levels remained high for a number of weeks despite her allegedly not taking it. She then missed some appts and was admitted to [facility] and was severely hypothyroid with a TSH of 120. She was started on Synthroid BEBETO in the hospital. She is now on 125 mcg with a low TSH of 0.14 but Dr Moe Christian is aware and is going to repeat the TSH in 2 months according to her office note of 2 weeks ago. Echo 3/15/21: EF 35-40%, biatrial enlargement, moderate global hypokinesis of left ventricle. Mild to moderate AR, grade 3 DD, RVSP 35-45mmHg ([facility] health echo)  Echo 6/19/20 EF 55% with mild MR, sclerotic AV, moderate AR  EKG 11/20/19>sinus rhythm, first degree AV block, PAC's, HR 61bpm  EKG 8/29/19>sinus rhythm, first degree AV block, HR 56bpm -Coreg decreased   Echo 4/24/14: [facility]) EF 55-60%, mild cLVH,  Moderate AR, mild AS, mildly dilated ascending aorta  Echo 1/9/04: normal left ventricular wall motion and contractility, may be diastolic dysfunction of left ventricle, no significant valvular abnormalities seen. Left atrial size normal     3. Carotid stenosis -stable  Doppler 1/45: 1-98% LICA, 97-80% KATIUSKA    5. Hypothyroidism -10/16: TSH 9.66, intolerance? to Synthroid and was  on [place] Thyroid. Previously referred to Dr Linh Zhu. She was instructed to stop [place] Thyroid after TSH 0.173 and free T4 6.97 on labs 7/23/20. Labs were repeated 8/13/20 and TSH 0.405 w/ free T4 3.55. she says she restarted [place] Thyroid recently because she feels better on it. Instructed, again at last visit, to stop as it can cause heart rhythm irregularities. Repeat TSH 10/6/20: 0.438, T4 5.24  TSH 11/13/20>112, TSH 0.14 she is now taking Synthroid 125mcg daily    6.  GIB - gastric ulcers and she is now on protonix. She is also on iron. 7. Diarrhea - she has had this intermittently in the past and blamed this on Eliquis. I told her to take the eliquis as RXd. (NO reports of diarrhea with eliquis on the drug information website). 8. Diastolic heart failure - she has had chf in the past and was refusing to take lasix until she gets a catheter. She was admitted to [facility] 3/15/21 with combined C/DHF from not taking Lasix. She was diuresed with IV Lasix and underwent an echo. PLAN: Add Aldactone 25mg daily in addition to Lasix. Stop K+. Will repeat BNP, BMP, TSH, and T4 in 2 weeks. FU 1 month. Scribe's attestation: This note was scribed in the presence of Dr Naty Rojo MD by Madeline Arizmendi, MERRY. The scribe's documentation has been prepared under my direction and personally reviewed by me in its entirety. I confirm that the note above accurately reflects all work, treatment, procedures, and medical decision making performed by me. Thank you for allowing to me to participate in the care of WESTSTEPHANY Ronniejoel.

## 2021-04-13 LAB
ANION GAP 4: 8 MMOL/L (ref 7–16)
B-TYPE NATRIURETIC PEPTIDE: 457 PG/ML
BUN BLDV-MCNC: 37 MG/DL (ref 7–25)
CALCIUM SERPL-MCNC: 9.5 MG/DL (ref 8.6–10.2)
CHLORIDE BLD-SCNC: 104 MMOL/L (ref 98–107)
CO2: 31 MMOL/L (ref 21–31)
CREATININE + EGFR PANEL: 1.5 MG/DL (ref 0.6–1.2)
GFR CALCULATED: 34 ML/MIN/1.73M2
GFR CALCULATED: 41 ML/MIN/1.73M2
GLUCOSE: 75 MG/DL (ref 74–109)
POTASSIUM SERPL-SCNC: 3.5 MMOL/L (ref 3.5–5.3)
SODIUM BLD-SCNC: 143 MMOL/L (ref 136–145)
T4 FREE: 1.96 NG/DL (ref 0.61–1.12)
TSH ULTRASENSITIVE: 0.6 UIU/ML (ref 0.45–5.33)

## 2021-04-19 ENCOUNTER — TELEPHONE (OUTPATIENT)
Dept: CARDIOLOGY CLINIC | Age: 76
End: 2021-04-19

## 2021-04-19 NOTE — TELEPHONE ENCOUNTER
Spoke to pt she states the edema is the same as it was when she saw Dr. Laxmi Gar on 3/30. She is scheduled to see her next Wed.

## 2021-04-19 NOTE — TELEPHONE ENCOUNTER
Per Dr. Shelbi Carias take 40 mg daily until her apt next week. I spoke with pt and gave instructions. Pt verbalized understanding.

## 2021-04-19 NOTE — TELEPHONE ENCOUNTER
Pt calling to advise her feet and legs are still swollen. She can't get shoes on. She has been taking her Lasix, but it's not doing much good. Pls call to advise on what to do. Thank you.

## 2021-04-20 RX ORDER — FUROSEMIDE 20 MG/1
TABLET ORAL
Qty: 60 TABLET | Refills: 6 | Status: SHIPPED | OUTPATIENT
Start: 2021-04-20

## 2021-04-28 ENCOUNTER — OFFICE VISIT (OUTPATIENT)
Dept: CARDIOLOGY CLINIC | Age: 76
End: 2021-04-28
Payer: COMMERCIAL

## 2021-04-28 VITALS
OXYGEN SATURATION: 98 % | HEART RATE: 67 BPM | WEIGHT: 168 LBS | HEIGHT: 64 IN | DIASTOLIC BLOOD PRESSURE: 60 MMHG | BODY MASS INDEX: 28.68 KG/M2 | SYSTOLIC BLOOD PRESSURE: 110 MMHG

## 2021-04-28 DIAGNOSIS — I48.91 ATRIAL FIBRILLATION, UNSPECIFIED TYPE (HCC): Primary | ICD-10-CM

## 2021-04-28 DIAGNOSIS — E78.49 OTHER HYPERLIPIDEMIA: ICD-10-CM

## 2021-04-28 DIAGNOSIS — I10 ESSENTIAL HYPERTENSION: ICD-10-CM

## 2021-04-28 PROCEDURE — 99214 OFFICE O/P EST MOD 30 MIN: CPT | Performed by: INTERNAL MEDICINE

## 2021-04-28 NOTE — PROGRESS NOTES
[facility]   Cardiac Evaluation      Patient: Kristy Hernandez  YOB: 1945  Date: 8/10/15       Chief Complaint   Patient presents with    Atrial Fibrillation    Hypertension        Referring provider: Matthew Miranda MD    History of Present Illness:   Ms America Higgins is seen today for follow up to CHF. She is treated for hypertension and paroxysmal atrial fibrillation. She continues to struggle with multiple sclerosis which is getting worse. Sal Haile states in August, 2011 she had abdominal surgery with Dr Alex Bernstein for a paraesophageal hernia with Nissen fundoplication and a G tube. March, 2014 she had angioedema from lisinopril. She has been seen often due to recurrent AF with RVR that I initially thought was due to iatrogenic hyperthyroidism. When her AF returned I started Eliquis. Initially she told me it caused lethargy and wouldn't take it but ultimately relented. She has had ongoing issues with diarrhea and black stools. Because of transportation issues, she had not seen GI as an outpatient. Eliquis was subsequently stopped due to black stools but ultimately she had a GI work up and is now back on it despite complaining that it also caused diarrhea. On 11/11/20 she went to [facility] ER w/ HR in the 30's, fatigue, and weakness. She had asked to be transferred to [facility]. Sal Haile was found to be in ARF w/ severe hypothyroidism. She was seen at [facility] by Dr Nicki Burciaga and it was recommended she undergo GI workup. Dr Kavon Adams saw her and performed EGD. It was determined she could restart Eliquis. Dr Nicki Burciaga spoke w/ her about ALVARO and CV. She did not want to stay in the hospital, so was discharged home 11/13/20. Sal Haile then presented to [facility] on 11/15/20 w/ profound weakness, altered mental status, and severe hypothyroidism w/ TSH >100. Trops were elevated and she was seen by cardiology for NSTEMI. hgb on admission was 11.4, then on 11/21/20 hgb dropped to 6.7.  She was transfused on sputum production. No hematemesis. · Gastrointestinal: No abdominal pain, blood in stools. She has chronic diarrhea and does not have an appetite. She now has black stools but is on iron tabs. · Genitourinary: No nocturia, dysuria, trouble voiding  · Musculoskeletal:  No gait disturbance, weakness or joint complaints. · Integumentary: No rash or pruritis. · Neurological: No headache, change in muscle strength, numbness or tingling. MS is worse this year  · Psychiatric: No anxiety or depression  · Endocrine: No malaise or fever  · Hematologic/Lymphatic: No abnormal bruising or bleeding, blood clots or swollen lymph nodes. · Allergic/Immunologic: No nasal congestion or hives. Physical Examination:    Vitals:    04/28/21 1431   BP: 110/60   Site: Right Upper Arm   Position: Sitting   Cuff Size: Medium Adult   Pulse: 67   SpO2: 98%   Weight: 168 lb (76.2 kg)   Height: 5' 4\" (1.626 m)     Body mass index is 28.84 kg/m². Wt Readings from Last 3 Encounters:   04/28/21 168 lb (76.2 kg)   03/30/21 170 lb (77.1 kg)   11/13/20 185 lb 14.4 oz (84.3 kg)     BP Readings from Last 3 Encounters:   04/28/21 110/60   03/30/21 110/80   01/29/21 120/80        Constitutional and General Appearance:  appears stated age, in a wheelchair, pale and edematous. Respiratory:  · Normal excursion and expansion without use of accessory muscles  · Resp Auscultation: bilateral rales in the bases  Cardiovascular:  · The apical impulses not displaced  · Heart is irregularly irregular rhythm with normal S1, S2; rates are better controlled. · The carotid upstroke is normal, bruit noted   · JVP is not elevated  · Peripheral pulses are symmetrical  · There is no clubbing, cyanosis of the extremities  · 3+ edema bilateral below the knee  · Femoral Arteries: 2+ and equal  · Pedal Pulses: 2+ and equal   Abdomen:  · No masses or tenderness  · Normal bowel sounds  Neurological/Psychiatric:  · Alert and oriented x3  · Very weak.    In a wheel chair    Assessment/Plan  1. Essential hypertension -well controlled, b/p's from home are mostly controlled    2. Atrial fibrillation -paroxysmal, She has had AF continuously since being found to be hyperthyroid on armour thyroid. Her T4 levels remained high for a number of weeks despite her allegedly not taking it. She then missed some appts and was admitted to [facility] and was severely hypothyroid with a TSH of 120. She was started on Synthroid BEBETO in the hospital. She is now on 125 mcg with a low TSH of 0.14 but Dr Michael Ritter is aware and is going to repeat the TSH in 2 months according to her office note of 2 weeks ago. Echo 3/15/21: EF 35-40%, biatrial enlargement, moderate global hypokinesis of left ventricle. Mild to moderate AR, grade 3 DD, RVSP 35-45mmHg ([facility] echo)  Echo 6/19/20 EF 55% with mild MR, sclerotic AV, moderate AR  EKG 11/20/19>sinus rhythm, first degree AV block, PAC's, HR 61bpm  EKG 8/29/19>sinus rhythm, first degree AV block, HR 56bpm -Coreg decreased   Echo 4/24/14: [facility]) EF 55-60%, mild cLVH,  Moderate AR, mild AS, mildly dilated ascending aorta  Echo 1/9/04: normal left ventricular wall motion and contractility, may be diastolic dysfunction of left ventricle, no significant valvular abnormalities seen. Left atrial size normal     3. Carotid stenosis -stable  Doppler 7/29: 0-99% LICA, 57-68% KATIUSKA    5. Hypothyroidism -10/16: TSH 9.66, intolerance? to Synthroid and was  on [place] Thyroid. Previously referred to Dr Bishop Longoria. She was instructed to stop [place] Thyroid after TSH 0.173 and free T4 6.97 on labs 7/23/20. Labs were repeated 8/13/20 and TSH 0.405 w/ free T4 3.55. she says she restarted [place] Thyroid recently because she feels better on it. Instructed, again at last visit, to stop as it can cause heart rhythm irregularities. Repeat TSH 10/6/20: 0.438, T4 5.24  TSH 11/13/20>112, TSH 0.14 she is now taking Synthroid 125mcg daily    6.  GIB - gastric ulcers and she is now on protonix. She is also on iron. 7. Diarrhea - she has had this intermittently in the past and blamed this on Eliquis. I told her to take the eliquis as RXd. (NO reports of diarrhea with eliquis on the drug information website). 8. Diastolic heart failure - she has had chf in the past and was refusing to take lasix until she gets a catheter. She was admitted to [facility] 3/15/21 with combined C/DHF from not taking Lasix. She was diuresed with IV Lasix and underwent an echo. PLAN: Discussed taking diuretics as prescribed. Also, recommended she wear compression stockings with zippers, 15-20mmHg. Leg exercises are encouraged, as well. FU 3 months. Scribe's attestation: This note was scribed in the presence of Dr Reba Sorensen MD by Gerald Frazier RN. The scribe's documentation has been prepared under my direction and personally reviewed by me in its entirety. I confirm that the note above accurately reflects all work, treatment, procedures, and medical decision making performed by me. Thank you for allowing to me to participate in the care of WESTSTEPHANY Szymanski.

## 2021-05-03 RX ORDER — PANTOPRAZOLE SODIUM 40 MG/1
TABLET, DELAYED RELEASE ORAL
Qty: 90 TABLET | Refills: 1 | Status: SHIPPED | OUTPATIENT
Start: 2021-05-03

## 2021-05-04 RX ORDER — CARVEDILOL 25 MG/1
TABLET ORAL
Qty: 180 TABLET | Refills: 3 | Status: SHIPPED | OUTPATIENT
Start: 2021-05-04

## 2021-05-13 ENCOUNTER — TELEPHONE (OUTPATIENT)
Dept: CARDIOLOGY CLINIC | Age: 76
End: 2021-05-13

## 2021-05-13 DIAGNOSIS — I50.9 ACUTE ON CHRONIC CONGESTIVE HEART FAILURE, UNSPECIFIED HEART FAILURE TYPE (HCC): Primary | ICD-10-CM

## 2021-05-13 RX ORDER — METOLAZONE 2.5 MG/1
TABLET ORAL
Qty: 15 TABLET | Refills: 0 | Status: SHIPPED | OUTPATIENT
Start: 2021-05-13 | End: 2021-05-25

## 2021-05-13 RX ORDER — POTASSIUM CHLORIDE 20 MEQ/1
20 TABLET, EXTENDED RELEASE ORAL DAILY
Qty: 30 TABLET | Refills: 1 | Status: SHIPPED | OUTPATIENT
Start: 2021-05-13 | End: 2021-05-26 | Stop reason: SINTOL

## 2021-05-13 NOTE — TELEPHONE ENCOUNTER
Spoke w/ pt. She has LE edema with 2 seeping areas the size of a quarter and states her legs \"are hard as a rock\". She sleeps in her recliner. She states she is taking Lasix 20mg and Aldactone 25mg both in the am.    Per Dr Mabry Moder 2.5mg M-W-F only with BMP Thursday 5/20/21 and Klor-Con 20meq qd. I relayed specific instructions to Liat Stevensy and let her know this is a strong diuretic and she should only take as instructed and make sure she has labs drawn in 1 week. Liat Sangeeta verbalized understanding. I also asked that she call on Tuesday to report how edema and seeping areas are.  Lab slip faxed to [facility] and rx's sent to [facility]

## 2021-05-13 NOTE — TELEPHONE ENCOUNTER
Pt calling she is having some swelling in both legs from her knee down and the left leg is seeping in 3 different places, states it started 2 days ago,  pt wants to know what to do? pls advise thank you

## 2021-05-18 ENCOUNTER — TELEPHONE (OUTPATIENT)
Dept: CARDIOLOGY CLINIC | Age: 76
End: 2021-05-18

## 2021-05-18 NOTE — TELEPHONE ENCOUNTER
I spoke with pt, she states her legs are a little better. She took a Metolazone on Friday and today. She now has an external catheter. She also said the areas that were seeping are now scabbed over but her sister questioned if there was infection under the scab.

## 2021-05-18 NOTE — TELEPHONE ENCOUNTER
I called Yi Rolon and let her know Dr Marielle Sutherland response. She verbalized understanding. She states the scabbed area is red, but no warm or painful to the touch.  She is going to get her labs drawn on Thursday as instructed

## 2021-05-25 ENCOUNTER — TELEPHONE (OUTPATIENT)
Dept: CARDIOLOGY CLINIC | Age: 76
End: 2021-05-25

## 2021-05-25 NOTE — TELEPHONE ENCOUNTER
ADRYAN: Spoke w/ pt regarding the edema in her LE's and ankles. She states her legs are better and she is sleeping in a bed now with her legs elevated. She did not get labs drawn 5/20/21 because she didn't have a . I advised her that she really needs to get BMP ASAP drawn since she has been taking Metolazone. She states she will try and get to [facility] today. She is going to stop Metolazone since her legs are improved and continue Lasix and Aldactone.

## 2021-05-26 ENCOUNTER — TELEPHONE (OUTPATIENT)
Dept: CARDIOLOGY CLINIC | Age: 76
End: 2021-05-26

## 2021-05-26 DIAGNOSIS — I10 ESSENTIAL HYPERTENSION: Primary | ICD-10-CM

## 2021-05-26 LAB
ANION GAP SERPL CALCULATED.3IONS-SCNC: 6 MMOL/L (ref 6–18)
BUN BLDV-MCNC: 51 MG/DL (ref 8–26)
CALCIUM SERPL-MCNC: 9.1 MG/DL (ref 8.8–10.1)
CHLORIDE BLD-SCNC: 100 MEQ/L (ref 98–111)
CO2: 24 MMOL/L (ref 21–31)
CREAT SERPL-MCNC: 2.24 MG/DL (ref 0.44–1.03)
GFR AFRICAN AMERICAN: 23 ML/MIN/1.73 M2
GFR NON-AFRICAN AMERICAN: 20 ML/MIN/1.73 M2
GLUCOSE BLD-MCNC: 68 MG/DL (ref 70–99)
POTASSIUM SERPL-SCNC: 6.3 MEQ/L (ref 3.6–5.1)
SODIUM BLD-SCNC: 130 MEQ/L (ref 135–145)

## 2021-05-26 NOTE — TELEPHONE ENCOUNTER
I called her and advised her to stop Aldactone and K+ immediately, she quit taking Metolazone Monday. I explained to her she has to get repeat BMP in 1 week. She states she will try and find a ride. Lab slip faxed to [facility].

## 2021-05-26 NOTE — TELEPHONE ENCOUNTER
----- Message from Lindsey Niño MD sent at 5/26/2021  1:24 PM EDT -----  Stop potassium and aldactone immediately and repeat bmp in one week.

## 2021-05-30 ENCOUNTER — TELEPHONE (OUTPATIENT)
Dept: CARDIOLOGY | Age: 76
End: 2021-05-30

## 2021-05-30 NOTE — TELEPHONE ENCOUNTER
On call cardiology note: 8 am on 5/30/21  \"I am peeing blood\" x2. Started when got up this morning. No clots. Wears depends, woke up with blood in depends. Associated with vaginal burning. Always has back pain and always cold. No fever.   Recommend not taking blood thinner this morning and going to Er to be evaluated

## 2021-07-12 RX ORDER — METOLAZONE 2.5 MG/1
TABLET ORAL
Qty: 15 TABLET | Refills: 0 | OUTPATIENT
Start: 2021-07-12

## 2021-08-24 ENCOUNTER — TELEPHONE (OUTPATIENT)
Dept: CARDIOLOGY CLINIC | Age: 76
End: 2021-08-24

## 2021-08-24 NOTE — TELEPHONE ENCOUNTER
Pt states she has been to [facility] twice for blood in urine. Pt states she was taken off eliquis and put back on. States she is home and still bleeding and very weak. Pt states she also had afib. Pt is asking to speak to [facility]. Please call to discuss.

## 2021-08-25 NOTE — TELEPHONE ENCOUNTER
I called Jessenia Del Rosario, she states \"I think it's all under control\". She states she is referring to the hematuria. She had a suprapubic catheter placed 8/17/21 and has developed bleeding. She was in atrial fibrillation w/ RVR and received IV Cardizem on 8/17/21. She states she has been told different things about taking Eliquis and she doesn't know whether to restart. She has an appt with Dr Donovan Lacks 9/13/21. She still has bleeding in the urine but states it is dark blood and without clots. Dr Triny Wise spoke w/ Jessenia Del Roasrio and discussed holding Eliquis until Friday and she is going to call back with an update on hematuria. Dr Triny Wise discussed with Jessenia Del Rosario the risk of stroke while off Eliquis and Jessenia Del Rosario verbalized understanding.

## 2021-08-25 NOTE — TELEPHONE ENCOUNTER
Pt called back she wants someone to call her back, her sister took the message she was on her way to the [facility]

## 2021-09-02 RX ORDER — METOLAZONE 2.5 MG/1
TABLET ORAL
Qty: 15 TABLET | Refills: 0 | OUTPATIENT
Start: 2021-09-02

## 2021-09-02 RX ORDER — DILTIAZEM HYDROCHLORIDE 240 MG/1
240 CAPSULE, COATED, EXTENDED RELEASE ORAL NIGHTLY
Qty: 30 CAPSULE | Refills: 3 | Status: SHIPPED | OUTPATIENT
Start: 2021-09-02 | End: 2021-09-08 | Stop reason: SDUPTHER

## 2021-09-08 ENCOUNTER — OFFICE VISIT (OUTPATIENT)
Dept: CARDIOLOGY CLINIC | Age: 76
End: 2021-09-08
Payer: COMMERCIAL

## 2021-09-08 VITALS — DIASTOLIC BLOOD PRESSURE: 72 MMHG | SYSTOLIC BLOOD PRESSURE: 130 MMHG | HEART RATE: 58 BPM | OXYGEN SATURATION: 98 %

## 2021-09-08 DIAGNOSIS — I48.91 ATRIAL FIBRILLATION, UNSPECIFIED TYPE (HCC): Primary | ICD-10-CM

## 2021-09-08 DIAGNOSIS — G45.9 TIA (TRANSIENT ISCHEMIC ATTACK): ICD-10-CM

## 2021-09-08 DIAGNOSIS — I10 ESSENTIAL HYPERTENSION: ICD-10-CM

## 2021-09-08 DIAGNOSIS — E78.49 OTHER HYPERLIPIDEMIA: ICD-10-CM

## 2021-09-08 PROCEDURE — 99214 OFFICE O/P EST MOD 30 MIN: CPT | Performed by: INTERNAL MEDICINE

## 2021-09-08 RX ORDER — POTASSIUM CHLORIDE 750 MG/1
10 TABLET, FILM COATED, EXTENDED RELEASE ORAL 2 TIMES DAILY
COMMUNITY
Start: 2021-08-22 | End: 2021-09-10 | Stop reason: SDUPTHER

## 2021-09-08 RX ORDER — LEVOTHYROXINE SODIUM 0.12 MG/1
125 TABLET ORAL DAILY
Qty: 90 TABLET | Refills: 1 | Status: CANCELLED | OUTPATIENT
Start: 2021-09-08

## 2021-09-08 NOTE — PROGRESS NOTES
[address]   Cardiac Evaluation      Patient: Chelo Foley  YOB: 1945  Date: 8/10/15       Chief Complaint   Patient presents with    Atrial Fibrillation    Hypertension        Referring provider: Edinson Engel MD    History of Present Illness:   Ms Luke Maradiaga is seen today for follow up to CHF. She is treated for hypertension and paroxysmal atrial fibrillation. She continues to struggle with multiple sclerosis which is getting worse. Tabatha Turner states in August, 2011 she had abdominal surgery with Dr Priya Gill for a paraesophageal hernia with Nissen fundoplication and a G tube. March, 2014 she had angioedema from lisinopril. She has been seen often due to recurrent AF with RVR that I initially thought was due to iatrogenic hyperthyroidism. When her AF returned I started Eliquis. Initially she told me it caused lethargy and wouldn't take it but ultimately relented. She has had ongoing issues with diarrhea and black stools. Because of transportation issues, she had not seen GI as an outpatient. Eliquis was subsequently stopped due to black stools but ultimately she had a GI work up and is now back on it despite complaining that it also caused diarrhea. On 11/11/20 she went to [facility] ER w/ HR in the 30's, fatigue, and weakness. She had asked to be transferred to [facility]. Tabatha Turner was found to be in ARF w/ severe hypothyroidism. She was seen at [facility] by Dr Tonny Mckeon and it was recommended she undergo GI workup. Dr Peace Cardoso saw her and performed EGD. It was determined she could restart Eliquis. Dr Tonny Mckeon spoke w/ her about ALVARO and CV. She did not want to stay in the hospital, so was discharged home 11/13/20. Tabatha Turner then presented to \[facility]\"" on 11/15/20 w/ profound weakness, altered mental status, and severe hypothyroidism w/ TSH >100. Trops were elevated and she was seen by cardiology for NSTEMI. hgb on admission was 11.4, then on 11/21/20 hgb dropped to 6.7.  She was transfused on the 20th and 21st. She had repeat EGD with Dr Porfirio Miller w/ gastric ulcer shown. She was released on Lasix 20mg daily and Eliquis BID. She was hospitalized 3/15/21 at [facility] with acute CHF. She was admitted with increased shortness of breath. She underwent an echo and was diuresed with IV Lasix. She was discharged on Lasix. Jessenia Del Rosario has been hospitalized 4 times since our last visit. She was hospitalized for AF w/ RVR and heart failure. Other admissions have been for hematuria. She has a suprapubic catheter now and has not been taking Eliquis. She is also out of Synthroid and states she has been out of it for a while. Jessenia Del Rosario is here with her son today. She denies chest pain, palpitations, CASTANEDA, dizziness. Keily's feet are swollen. She is unable to transfer to her bed currently and sleeps in a recliner. Past Medical History:   has a past medical history of Hyperlipidemia, Hypertension, Hypothyroidism, MS (multiple sclerosis) ([facility] [address] and Pituitary tumor. Surgical History:  Abdominal surgery, ankle surgery    Social History:  History     Social History    Marital Status:      Spouse Name: N/A     Number of Children: Ceci Hernandez Years of Education: N/A     Occupational History    Western artist     Social History Main Topics    Smoking status: Never Smoker     Smokeless tobacco: Not on file    Alcohol Use: No    Drug Use: No    Sexually Active:       . 2 children who are alive and well. Other Topics Concern    Not on file     Social History Narrative    No narrative on file       Family History:  family history includes Heart Disease in her mother.      Allergies:  Clonidine, Lisinopril, Baclofen, Cefdinir, Citalopram, Citalopram hydrobromide, Levofloxacin, Levothyroxine sodium, Lyrica [pregabalin], Morphine and related, Naproxen sodium, Neurontin [gabapentin], Nitrofurantoin, Prednisone, and Tolterodine     Review of Systems:   · Constitutional: there has been no unanticipated weight loss. LE extremities are weak  · Eyes: No visual changes   · ENT: No Headaches, hearing loss or vertigo. No mouth sores or sore throat. · Cardiovascular: Reviewed in HPI  · Respiratory: No cough or wheezing, no sputum production. No hematemesis. · Gastrointestinal: No abdominal pain, blood in stools. She has chronic diarrhea and does not have an appetite. She now has black stools but is on iron tabs. · Genitourinary: No nocturia, dysuria, trouble voiding  · Musculoskeletal:  No gait disturbance, weakness or joint complaints. · Integumentary: No rash or pruritis. · Neurological: No headache, change in muscle strength, numbness or tingling. MS is worse this year  · Psychiatric: No anxiety or depression  · Endocrine: No malaise or fever  · Hematologic/Lymphatic: No abnormal bruising or bleeding, blood clots or swollen lymph nodes. · Allergic/Immunologic: No nasal congestion or hives. Physical Examination:    Vitals:    09/08/21 1612   BP: 130/72   Site: Left Upper Arm   Position: Sitting   Cuff Size: Medium Adult   Pulse: 58   SpO2: 98%     There is no height or weight on file to calculate BMI. Wt Readings from Last 3 Encounters:   04/28/21 168 lb (76.2 kg)   03/30/21 170 lb (77.1 kg)   11/13/20 185 lb 14.4 oz (84.3 kg)     BP Readings from Last 3 Encounters:   09/08/21 130/72   04/28/21 110/60   03/30/21 110/80        Constitutional and General Appearance:  appears stated age, in a wheelchair, pale and edematous. Respiratory:  · Normal excursion and expansion without use of accessory muscles  · Resp Auscultation: lungs are clear to auscultation  Cardiovascular:  · The apical impulses not displaced  · Heart is irregularly irregular rhythm with normal S1, S2; rates are better controlled.    · The carotid upstroke is normal, bruit noted   · JVP is not elevated  · Peripheral pulses are symmetrical  · There is no clubbing, cyanosis of the extremities  · 2+ edema bilateral below the knee  · Femoral Arteries: 2+ and equal  · Pedal Pulses: 2+ and equal   Abdomen:  · No masses or tenderness  · Normal bowel sounds  Neurological/Psychiatric:  · Alert and oriented x3  · Very weak. In a wheel chair    Assessment/Plan  1. Essential hypertension -well controlled, b/p's from home are mostly controlled    2. Atrial fibrillation -paroxysmal, She has had AF continuously since being found to be hyperthyroid on armour thyroid. Her T4 levels remained high for a number of weeks despite her allegedly not taking it. She then missed some appts and was admitted to [facility] ER and was severely hypothyroid with a TSH of 120. She was started on Synthroid BEBETO in the hospital. She had been controlled on synthroid but has recently quit taking it. Echo 3/15/21: EF 35-40%, biatrial enlargement, moderate global hypokinesis of left ventricle. Mild to moderate AR, grade 3 DD, RVSP 35-45mmHg ([facility] echo)  Echo 6/19/20 EF 55% with mild MR, sclerotic AV, moderate AR  EKG 11/20/19>sinus rhythm, first degree AV block, PAC's, HR 61bpm  EKG 8/29/19>sinus rhythm, first degree AV block, HR 56bpm -Coreg decreased   Echo 4/24/14: [facility]) EF 55-60%, mild cLVH,  Moderate AR, mild AS, mildly dilated ascending aorta  Echo 1/9/04: normal left ventricular wall motion and contractility, may be diastolic dysfunction of left ventricle, no significant valvular abnormalities seen. Left atrial size normal     3. Carotid stenosis -stable  Doppler 5/27: 0-26% LICA, 76-12% KATIUSKA    5. Hypothyroidism -10/16: TSH 9.66, intolerance? to Synthroid and was  on [place] Thyroid. Previously referred to Dr Lamar Meade. She was instructed to stop [place] Thyroid after TSH 0.173 and free T4 6.97 on labs 7/23/20. Labs were repeated 8/13/20 and TSH 0.405 w/ free T4 3.55. she says she restarted [place] Thyroid recently because she feels better on it. Instructed, again at last visit, to stop as it can cause heart rhythm irregularities.  Repeat TSH 10/6/20: 0.438, T4 5.24  TSH 11/13/20>112, TSH 0.598 on labs 4/13/21, she was on Synthroid at that time, she is not taking Synthroid 125mcg currently, she will call pcp for refill     6. GIB - gastric ulcers and she is now on protonix. She is also on iron. 7. Diarrhea - she has had this intermittently in the past and blamed this on Eliquis. I told her to take the eliquis as RXd. (NO reports of diarrhea with eliquis on the drug information website). 8. Diastolic heart failure - she has had chf in the past and was refusing to take lasix until she got a catheter. She has a suprapubic catheter now. She was admitted to [facility] 3/15/21 with combined C/DHF from not taking Lasix. She was diuresed with IV Lasix and underwent an echo. PLAN:  Advised to call pcp regarding synthroid refill. Eliquis discussed at length; I have told her she is at increased risk for a stroke. She prefers to stay off until her urinary bleeding has been stable and this is very understandable. I have discussed the possibility of a Watchman device and she is interested in talking to EP about this. Scribe's attestation: This note was scribed in the presence of Dr Bayron Pope MD by Kumar Leal RN. María Elena Tucker The scribe's documentation has been prepared under my direction and personally reviewed by me in its entirety. I confirm that the note above accurately reflects all work, treatment, procedures, and medical decision making performed by me. Thank you for allowing to me to participate in the care of WESTSTEPHANY Szymanski.

## 2021-09-09 RX ORDER — DILTIAZEM HYDROCHLORIDE 240 MG/1
240 CAPSULE, COATED, EXTENDED RELEASE ORAL NIGHTLY
Qty: 90 CAPSULE | Refills: 3 | Status: SHIPPED | OUTPATIENT
Start: 2021-09-09

## 2021-09-10 ENCOUNTER — TELEPHONE (OUTPATIENT)
Dept: CARDIOLOGY CLINIC | Age: 76
End: 2021-09-10

## 2021-09-10 RX ORDER — POTASSIUM CHLORIDE 750 MG/1
10 TABLET, FILM COATED, EXTENDED RELEASE ORAL 2 TIMES DAILY
Qty: 180 TABLET | Refills: 1 | Status: SHIPPED | OUTPATIENT
Start: 2021-09-10

## 2021-09-10 NOTE — TELEPHONE ENCOUNTER
I spoke to pt and gave instructions. RX for Potassium sent to the pharmacy and I advised her to call her PCP for the iron.

## 2021-09-21 ENCOUNTER — TELEPHONE (OUTPATIENT)
Dept: CARDIOLOGY CLINIC | Age: 76
End: 2021-09-21

## 2021-09-21 NOTE — TELEPHONE ENCOUNTER
Nima Hernandez from [employer] called wanting to get the pt liliane with [facility], the orders stated liliane within week, pls advise thank you.     Madi Chávez  [phone]

## 2021-09-23 NOTE — TELEPHONE ENCOUNTER
Spoke to Agustín Doshi scheduled in [facility]. She could not come on Tue they did not have transportation.

## 2021-09-28 ENCOUNTER — TELEPHONE (OUTPATIENT)
Dept: CARDIOLOGY CLINIC | Age: 76
End: 2021-09-28

## 2021-09-28 NOTE — TELEPHONE ENCOUNTER
Anastasia Bowens had an appt 09/30/21 with Dr Graham Dominguez that was made following recent hospitalization. She is currently at [facility] for rehab. Dr Graham Dominguez reviewed all of Keily's meds, labs, and VS faxed from [facility] and stated she does not need to see her at this time. It is difficult for Anastasia Bowens to ambulate due to her MS and she is taking her medications daily at [facility] [place] (she has had trouble in the past taking her medications due to various reasons). I called and spoke w/ nurse manager at [facility] and let her know Anastasia Bowens does not need to be seen at this time, but to call if Anastasia Bowens needs anything.

## (undated) DEVICE — MOUTHPIECE ENDOSCP L CTRL OPN AND SIDE PORTS DISP

## (undated) DEVICE — SYRINGE INFL 60ML DISP ALLIANCE II

## (undated) DEVICE — WORKING LENGTH 155CM, WORKING CHANNEL 2.8MM: Brand: RESOLUTION 360 CLIP

## (undated) DEVICE — BW-412T DISP COMBO CLEANING BRUSH: Brand: SINGLE USE COMBINATION CLEANING BRUSH

## (undated) DEVICE — REPLAY HEMOSTASIS CLIP, 16MM SPAN: Brand: REPLAY

## (undated) DEVICE — SOLUTION IV IRRIG WATER 500ML POUR BRL ST 2F7113

## (undated) DEVICE — ESOPHAGEAL/BILIARY WIREGUIDED BALLOON DILATATION CATHETER: Brand: CRE™ PRO

## (undated) DEVICE — PROCEDURE KIT ENDOSCP CUST

## (undated) DEVICE — SET VLV 3 PC AWS DISPOSABLE GRDIAN SCOPEVALET

## (undated) DEVICE — Device: Brand: DISPOSABLE ELECTROSURGICAL SNARE

## (undated) DEVICE — FORCEPS BX L240CM WRK CHN 2.8MM STD CAP W/ NDL MIC MESH

## (undated) DEVICE — TRAP SPEC RETRV CLR PLAS POLYP IN LN SUCT QUIK CTCH